# Patient Record
Sex: FEMALE | Race: ASIAN | NOT HISPANIC OR LATINO | URBAN - METROPOLITAN AREA
[De-identification: names, ages, dates, MRNs, and addresses within clinical notes are randomized per-mention and may not be internally consistent; named-entity substitution may affect disease eponyms.]

---

## 2021-12-31 ENCOUNTER — INPATIENT (INPATIENT)
Facility: HOSPITAL | Age: 66
LOS: 0 days | Discharge: HOME | DRG: 178 | End: 2022-01-01
Attending: STUDENT IN AN ORGANIZED HEALTH CARE EDUCATION/TRAINING PROGRAM | Admitting: STUDENT IN AN ORGANIZED HEALTH CARE EDUCATION/TRAINING PROGRAM
Payer: MEDICARE

## 2021-12-31 VITALS
TEMPERATURE: 99 F | SYSTOLIC BLOOD PRESSURE: 150 MMHG | RESPIRATION RATE: 18 BRPM | OXYGEN SATURATION: 99 % | HEIGHT: 61 IN | HEART RATE: 67 BPM | WEIGHT: 117.95 LBS | DIASTOLIC BLOOD PRESSURE: 84 MMHG

## 2021-12-31 DIAGNOSIS — R77.8 OTHER SPECIFIED ABNORMALITIES OF PLASMA PROTEINS: ICD-10-CM

## 2021-12-31 DIAGNOSIS — U07.1 COVID-19: ICD-10-CM

## 2021-12-31 DIAGNOSIS — E87.8 OTHER DISORDERS OF ELECTROLYTE AND FLUID BALANCE, NOT ELSEWHERE CLASSIFIED: ICD-10-CM

## 2021-12-31 DIAGNOSIS — I63.9 CEREBRAL INFARCTION, UNSPECIFIED: ICD-10-CM

## 2021-12-31 DIAGNOSIS — I21.4 NON-ST ELEVATION (NSTEMI) MYOCARDIAL INFARCTION: ICD-10-CM

## 2021-12-31 DIAGNOSIS — Z29.9 ENCOUNTER FOR PROPHYLACTIC MEASURES, UNSPECIFIED: ICD-10-CM

## 2021-12-31 DIAGNOSIS — I10 ESSENTIAL (PRIMARY) HYPERTENSION: ICD-10-CM

## 2021-12-31 DIAGNOSIS — R42 DIZZINESS AND GIDDINESS: ICD-10-CM

## 2021-12-31 LAB
ALBUMIN SERPL ELPH-MCNC: 3.9 G/DL — SIGNIFICANT CHANGE UP (ref 3.5–5)
ALP SERPL-CCNC: 65 U/L — SIGNIFICANT CHANGE UP (ref 40–120)
ALT FLD-CCNC: 27 U/L DA — SIGNIFICANT CHANGE UP (ref 10–60)
ANION GAP SERPL CALC-SCNC: 10 MMOL/L — SIGNIFICANT CHANGE UP (ref 5–17)
ANION GAP SERPL CALC-SCNC: 10 MMOL/L — SIGNIFICANT CHANGE UP (ref 5–17)
ANISOCYTOSIS BLD QL: SLIGHT — SIGNIFICANT CHANGE UP
APTT BLD: 46.9 SEC — HIGH (ref 27.5–35.5)
AST SERPL-CCNC: 24 U/L — SIGNIFICANT CHANGE UP (ref 10–40)
BASOPHILS # BLD AUTO: 0.01 K/UL — SIGNIFICANT CHANGE UP (ref 0–0.2)
BASOPHILS NFR BLD AUTO: 0.4 % — SIGNIFICANT CHANGE UP (ref 0–2)
BILIRUB SERPL-MCNC: 0.4 MG/DL — SIGNIFICANT CHANGE UP (ref 0.2–1.2)
BUN SERPL-MCNC: 11 MG/DL — SIGNIFICANT CHANGE UP (ref 7–18)
BUN SERPL-MCNC: 9 MG/DL — SIGNIFICANT CHANGE UP (ref 7–18)
CALCIUM SERPL-MCNC: 7.6 MG/DL — LOW (ref 8.4–10.5)
CALCIUM SERPL-MCNC: 8.6 MG/DL — SIGNIFICANT CHANGE UP (ref 8.4–10.5)
CHLORIDE SERPL-SCNC: 85 MMOL/L — LOW (ref 96–108)
CHLORIDE SERPL-SCNC: 93 MMOL/L — LOW (ref 96–108)
CO2 SERPL-SCNC: 25 MMOL/L — SIGNIFICANT CHANGE UP (ref 22–31)
CO2 SERPL-SCNC: 29 MMOL/L — SIGNIFICANT CHANGE UP (ref 22–31)
CREAT SERPL-MCNC: 0.74 MG/DL — SIGNIFICANT CHANGE UP (ref 0.5–1.3)
CREAT SERPL-MCNC: 0.84 MG/DL — SIGNIFICANT CHANGE UP (ref 0.5–1.3)
EOSINOPHIL # BLD AUTO: 0 K/UL — SIGNIFICANT CHANGE UP (ref 0–0.5)
EOSINOPHIL NFR BLD AUTO: 0 % — SIGNIFICANT CHANGE UP (ref 0–6)
GLUCOSE SERPL-MCNC: 109 MG/DL — HIGH (ref 70–99)
GLUCOSE SERPL-MCNC: 138 MG/DL — HIGH (ref 70–99)
HCT VFR BLD CALC: 37.8 % — SIGNIFICANT CHANGE UP (ref 34.5–45)
HGB BLD-MCNC: 12.2 G/DL — SIGNIFICANT CHANGE UP (ref 11.5–15.5)
IMM GRANULOCYTES NFR BLD AUTO: 0.4 % — SIGNIFICANT CHANGE UP (ref 0–1.5)
INR BLD: 1 RATIO — SIGNIFICANT CHANGE UP (ref 0.88–1.16)
LYMPHOCYTES # BLD AUTO: 0.9 K/UL — LOW (ref 1–3.3)
LYMPHOCYTES # BLD AUTO: 33 % — SIGNIFICANT CHANGE UP (ref 13–44)
MAGNESIUM SERPL-MCNC: 1.9 MG/DL — SIGNIFICANT CHANGE UP (ref 1.6–2.6)
MANUAL SMEAR VERIFICATION: SIGNIFICANT CHANGE UP
MCHC RBC-ENTMCNC: 21.2 PG — LOW (ref 27–34)
MCHC RBC-ENTMCNC: 32.3 GM/DL — SIGNIFICANT CHANGE UP (ref 32–36)
MCV RBC AUTO: 65.7 FL — LOW (ref 80–100)
MICROCYTES BLD QL: SLIGHT — SIGNIFICANT CHANGE UP
MONOCYTES # BLD AUTO: 0.39 K/UL — SIGNIFICANT CHANGE UP (ref 0–0.9)
MONOCYTES NFR BLD AUTO: 14.3 % — HIGH (ref 2–14)
NEUTROPHILS # BLD AUTO: 1.42 K/UL — LOW (ref 1.8–7.4)
NEUTROPHILS NFR BLD AUTO: 51.9 % — SIGNIFICANT CHANGE UP (ref 43–77)
NRBC # BLD: 0 /100 WBCS — SIGNIFICANT CHANGE UP (ref 0–0)
NT-PROBNP SERPL-SCNC: 237 PG/ML — HIGH (ref 0–125)
PLAT MORPH BLD: NORMAL — SIGNIFICANT CHANGE UP
PLATELET # BLD AUTO: 139 K/UL — LOW (ref 150–400)
POTASSIUM SERPL-MCNC: 3.2 MMOL/L — LOW (ref 3.5–5.3)
POTASSIUM SERPL-MCNC: 3.3 MMOL/L — LOW (ref 3.5–5.3)
POTASSIUM SERPL-SCNC: 3.2 MMOL/L — LOW (ref 3.5–5.3)
POTASSIUM SERPL-SCNC: 3.3 MMOL/L — LOW (ref 3.5–5.3)
PROT SERPL-MCNC: 8.4 G/DL — HIGH (ref 6–8.3)
PROTHROM AB SERPL-ACNC: 11.9 SEC — SIGNIFICANT CHANGE UP (ref 10.6–13.6)
RBC # BLD: 5.75 M/UL — HIGH (ref 3.8–5.2)
RBC # FLD: 13.7 % — SIGNIFICANT CHANGE UP (ref 10.3–14.5)
RBC BLD AUTO: ABNORMAL
SARS-COV-2 RNA SPEC QL NAA+PROBE: DETECTED
SODIUM SERPL-SCNC: 124 MMOL/L — LOW (ref 135–145)
SODIUM SERPL-SCNC: 128 MMOL/L — LOW (ref 135–145)
TROPONIN I, HIGH SENSITIVITY RESULT: 112.7 NG/L — HIGH
TROPONIN I, HIGH SENSITIVITY RESULT: 112.9 NG/L — HIGH
WBC # BLD: 2.73 K/UL — LOW (ref 3.8–10.5)
WBC # FLD AUTO: 2.73 K/UL — LOW (ref 3.8–10.5)

## 2021-12-31 PROCEDURE — 99285 EMERGENCY DEPT VISIT HI MDM: CPT

## 2021-12-31 PROCEDURE — 99223 1ST HOSP IP/OBS HIGH 75: CPT | Mod: GC

## 2021-12-31 PROCEDURE — 93010 ELECTROCARDIOGRAM REPORT: CPT

## 2021-12-31 PROCEDURE — 71045 X-RAY EXAM CHEST 1 VIEW: CPT | Mod: 26

## 2021-12-31 RX ORDER — NIFEDIPINE 30 MG
90 TABLET, EXTENDED RELEASE 24 HR ORAL DAILY
Refills: 0 | Status: DISCONTINUED | OUTPATIENT
Start: 2021-12-31 | End: 2022-01-01

## 2021-12-31 RX ORDER — ASPIRIN/CALCIUM CARB/MAGNESIUM 324 MG
324 TABLET ORAL ONCE
Refills: 0 | Status: COMPLETED | OUTPATIENT
Start: 2021-12-31 | End: 2021-12-31

## 2021-12-31 RX ORDER — POTASSIUM CHLORIDE 20 MEQ
10 PACKET (EA) ORAL
Refills: 0 | Status: COMPLETED | OUTPATIENT
Start: 2021-12-31 | End: 2021-12-31

## 2021-12-31 RX ORDER — CLOPIDOGREL BISULFATE 75 MG/1
300 TABLET, FILM COATED ORAL ONCE
Refills: 0 | Status: COMPLETED | OUTPATIENT
Start: 2021-12-31 | End: 2021-12-31

## 2021-12-31 RX ORDER — SODIUM CHLORIDE 9 MG/ML
1000 INJECTION INTRAMUSCULAR; INTRAVENOUS; SUBCUTANEOUS ONCE
Refills: 0 | Status: COMPLETED | OUTPATIENT
Start: 2021-12-31 | End: 2021-12-31

## 2021-12-31 RX ORDER — ATORVASTATIN CALCIUM 80 MG/1
20 TABLET, FILM COATED ORAL AT BEDTIME
Refills: 0 | Status: DISCONTINUED | OUTPATIENT
Start: 2021-12-31 | End: 2022-01-01

## 2021-12-31 RX ORDER — ENOXAPARIN SODIUM 100 MG/ML
40 INJECTION SUBCUTANEOUS DAILY
Refills: 0 | Status: DISCONTINUED | OUTPATIENT
Start: 2021-12-31 | End: 2022-01-01

## 2021-12-31 RX ORDER — MECLIZINE HCL 12.5 MG
25 TABLET ORAL THREE TIMES A DAY
Refills: 0 | Status: DISCONTINUED | OUTPATIENT
Start: 2021-12-31 | End: 2022-01-01

## 2021-12-31 RX ORDER — ENOXAPARIN SODIUM 100 MG/ML
50 INJECTION SUBCUTANEOUS ONCE
Refills: 0 | Status: COMPLETED | OUTPATIENT
Start: 2021-12-31 | End: 2021-12-31

## 2021-12-31 RX ORDER — HYDRALAZINE HCL 50 MG
50 TABLET ORAL EVERY 8 HOURS
Refills: 0 | Status: DISCONTINUED | OUTPATIENT
Start: 2021-12-31 | End: 2022-01-01

## 2021-12-31 RX ORDER — ASPIRIN/CALCIUM CARB/MAGNESIUM 324 MG
81 TABLET ORAL DAILY
Refills: 0 | Status: DISCONTINUED | OUTPATIENT
Start: 2021-12-31 | End: 2022-01-01

## 2021-12-31 RX ORDER — ONDANSETRON 8 MG/1
4 TABLET, FILM COATED ORAL ONCE
Refills: 0 | Status: COMPLETED | OUTPATIENT
Start: 2021-12-31 | End: 2021-12-31

## 2021-12-31 RX ORDER — SODIUM CHLORIDE 9 MG/ML
3 INJECTION INTRAMUSCULAR; INTRAVENOUS; SUBCUTANEOUS EVERY 8 HOURS
Refills: 0 | Status: DISCONTINUED | OUTPATIENT
Start: 2021-12-31 | End: 2022-01-01

## 2021-12-31 RX ORDER — SODIUM CHLORIDE 9 MG/ML
1000 INJECTION INTRAMUSCULAR; INTRAVENOUS; SUBCUTANEOUS
Refills: 0 | Status: DISCONTINUED | OUTPATIENT
Start: 2021-12-31 | End: 2022-01-01

## 2021-12-31 RX ADMIN — Medication 100 MILLIEQUIVALENT(S): at 06:11

## 2021-12-31 RX ADMIN — SODIUM CHLORIDE 1000 MILLILITER(S): 9 INJECTION INTRAMUSCULAR; INTRAVENOUS; SUBCUTANEOUS at 04:56

## 2021-12-31 RX ADMIN — SODIUM CHLORIDE 3 MILLILITER(S): 9 INJECTION INTRAMUSCULAR; INTRAVENOUS; SUBCUTANEOUS at 21:11

## 2021-12-31 RX ADMIN — Medication 25 MILLIGRAM(S): at 13:46

## 2021-12-31 RX ADMIN — SODIUM CHLORIDE 80 MILLILITER(S): 9 INJECTION INTRAMUSCULAR; INTRAVENOUS; SUBCUTANEOUS at 12:23

## 2021-12-31 RX ADMIN — Medication 10 MILLIEQUIVALENT(S): at 07:11

## 2021-12-31 RX ADMIN — SODIUM CHLORIDE 3 MILLILITER(S): 9 INJECTION INTRAMUSCULAR; INTRAVENOUS; SUBCUTANEOUS at 05:39

## 2021-12-31 RX ADMIN — Medication 10 MILLIEQUIVALENT(S): at 08:36

## 2021-12-31 RX ADMIN — ATORVASTATIN CALCIUM 20 MILLIGRAM(S): 80 TABLET, FILM COATED ORAL at 23:29

## 2021-12-31 RX ADMIN — ONDANSETRON 4 MILLIGRAM(S): 8 TABLET, FILM COATED ORAL at 04:56

## 2021-12-31 RX ADMIN — Medication 100 MILLIEQUIVALENT(S): at 20:15

## 2021-12-31 RX ADMIN — CLOPIDOGREL BISULFATE 300 MILLIGRAM(S): 75 TABLET, FILM COATED ORAL at 06:11

## 2021-12-31 RX ADMIN — Medication 50 MILLIGRAM(S): at 23:27

## 2021-12-31 RX ADMIN — Medication 100 MILLIEQUIVALENT(S): at 09:02

## 2021-12-31 RX ADMIN — Medication 100 MILLIEQUIVALENT(S): at 23:27

## 2021-12-31 RX ADMIN — Medication 100 MILLIEQUIVALENT(S): at 07:36

## 2021-12-31 RX ADMIN — SODIUM CHLORIDE 80 MILLILITER(S): 9 INJECTION INTRAMUSCULAR; INTRAVENOUS; SUBCUTANEOUS at 23:28

## 2021-12-31 RX ADMIN — SODIUM CHLORIDE 3 MILLILITER(S): 9 INJECTION INTRAMUSCULAR; INTRAVENOUS; SUBCUTANEOUS at 13:02

## 2021-12-31 RX ADMIN — ENOXAPARIN SODIUM 50 MILLIGRAM(S): 100 INJECTION SUBCUTANEOUS at 06:56

## 2021-12-31 RX ADMIN — Medication 324 MILLIGRAM(S): at 06:11

## 2021-12-31 RX ADMIN — Medication 25 MILLIGRAM(S): at 23:26

## 2021-12-31 NOTE — ED PROVIDER NOTE - PHYSICAL EXAMINATION
Vital Signs Reviewed  GEN: Comfortable, NAD  HEENT: NCAT, MMM, Neck Supple  RESP: CTAB, No rales/rhonchi/wheezing  CV: RRR, S1S2, No murmurs  ABD: No TTP, ND, No masses  Extrem/Skin: Equal pulses bilat, No cyanosis/edema/rashes  Neuro: AAOx3, CNs Grossly Intact, Equal strength/sensation in all extremities bilat, No Pronator Drift, Vital Signs Reviewed  GEN: Comfortable, NAD  HEENT: NCAT, MMM, Neck Supple  RESP: CTAB, No rales/rhonchi/wheezing  CV: RRR, S1S2, No murmurs  ABD: No TTP, ND, No masses  Extrem/Skin: Equal pulses bilat, No cyanosis/edema/rashes  Neuro: AAOx3, CNs Grossly Intact, Equal strength/sensation in all extremities bilat, No Pronator Drift

## 2021-12-31 NOTE — H&P ADULT - PROBLEM SELECTOR PLAN 5
Continue aspirin and atorvastatin Takes nifedipine and hydralazine at home  Continue home medications with parameters

## 2021-12-31 NOTE — ED PROVIDER NOTE - CARE PLAN
Principal Discharge DX:	NSTEMI (non-ST elevation myocardial infarction)  Secondary Diagnosis:	Hyponatremia   1

## 2021-12-31 NOTE — H&P ADULT - NSHPPHYSICALEXAM_GEN_ALL_CORE
LOS:     VITALS:   T(C): 37 (12-31-21 @ 11:35), Max: 37.1 (12-31-21 @ 03:40)  HR: 60 (12-31-21 @ 08:00) (60 - 67)  BP: 149/68 (12-31-21 @ 11:35) (149/68 - 152/73)  RR: 17 (12-31-21 @ 11:35) (17 - 18)  SpO2: 97% (12-31-21 @ 11:35) (96% - 99%)    GENERAL: NAD, lying in bed comfortably  HEAD:  Atraumatic, Normocephalic  EYES: EOMI, PERRLA, conjunctiva and sclera clear  ENT: Moist mucous membranes  NECK: Supple, No JVD  CHEST/LUNG: Clear to auscultation bilaterally; No rales, rhonchi, wheezing, or rubs. Unlabored respirations  HEART: Regular rate and rhythm; No murmurs, rubs, or gallops  ABDOMEN: BSx4; Soft, nontender, nondistended  EXTREMITIES:  2+ Peripheral Pulses, brisk capillary refill. No clubbing, cyanosis, or edema  NERVOUS SYSTEM:  A&Ox3, no focal deficits   SKIN: No rashes or lesions

## 2021-12-31 NOTE — H&P ADULT - PROBLEM SELECTOR PLAN 3
Na 124 and K 3.3 on admission  Likely 2/2 vomiting and poor oral intake   Improving with IV fluids   Getting 3 riders of K  Continue NS at 80ml/hr T1 112, T2 112  EKG: no st changes  No need for further cardiac workup at the moment   Continue aspirin and atorvastatin   Dr. Mendiola consulted

## 2021-12-31 NOTE — H&P ADULT - HISTORY OF PRESENT ILLNESS
67y/o F with PMH of HTN , CVA presents to the ED for dizziness and vomiting for 2 days. Patient states she is very dizzy when she sits or stands and is worse with movement. She has NBNB vomiting 3-4 times yesterday. She states she cant even open her eyes because she gets very dizzy. Denies tinnitus, headache, LOC, vision or hearing changes, abdominal pain, diarrhea, constipation or any other complaints. She is vaccinated x2 with pfizer  67y/o F with PMH of HTN , CVA presents to the ED for dizziness and vomiting for 2 days. Patient states she is very dizzy when she sits or stands and is worse with movement. She has NBNB vomiting 3-4 times yesterday. She states she cant even open her eyes because she gets very dizzy. Denies tinnitus, headache, LOC, vision or hearing changes, abdominal pain, diarrhea, constipation or any other complaints. She is vaccinated x2 with pfizer. She went to her PCP yesterday who prescribed meclizine and zofran.

## 2021-12-31 NOTE — ED ADULT NURSE NOTE - OBJECTIVE STATEMENT
Patient presents to ED for complaint of dizziness. As per note, daughter stated that patient was nauseous, vomiting 2 days ago and reported high BP. Patient is lying on stretcher, in no acute distress. She is AAOx1 to person. No respiratory distress noted.

## 2021-12-31 NOTE — PATIENT PROFILE ADULT - FALL HARM RISK - HARM RISK INTERVENTIONS
Assistance with ambulation/Assistance OOB with selected safe patient handling equipment/Communicate Risk of Fall with Harm to all staff/Monitor gait and stability/Reinforce activity limits and safety measures with patient and family/Sit up slowly, dangle for a short time, stand at bedside before walking/Tailored Fall Risk Interventions/Visual Cue: Yellow wristband and red socks/Bed in lowest position, wheels locked, appropriate side rails in place/Call bell, personal items and telephone in reach/Instruct patient to call for assistance before getting out of bed or chair/Non-slip footwear when patient is out of bed/[place] to call system/Physically safe environment - no spills, clutter or unnecessary equipment/Purposeful Proactive Rounding/Room/bathroom lighting operational, light cord in reach

## 2021-12-31 NOTE — H&P ADULT - ATTENDING COMMENTS
a/p# COVID-19 # Dizziness- likely BPPV # Hyponatremia # Hypokalemia # Thrombocytopenia    -vaccinated, saturating well. will hold off on Remdesivir    -dizziness improved, non focal -will start Meclizine standing, if no improvement in next 48 hours than consider MRI [place] to r/o cerebellar stroke    -no more vomiting wants to eat- start diet    -likely hpovolumeic hyponatremia due to poor po intake and vomiting- ivf, rpt bmp in evening    -replete lytes    -out patient work up for thrombocytopenia a/p# COVID-19 # Dizziness- likely BPPV # Hyponatremia # Hypokalemia # Thrombocytopenia #Elevated troponins    -No cp/ sob or cardiac s/s- cardiology consult - Dr. Mendiola, trend trop    -vaccinated, saturating well. will hold off on Remdesivir    -dizziness improved, non focal -will start Meclizine standing, if no improvement in next 48 hours than consider MRI [facility] to r/o cerebellar stroke    -no more vomiting wants to eat- start diet    -likely hpovolumeic hyponatremia due to poor po intake and vomiting- ivf, rpt bmp in evening    -replete lytes    -out patient work up for thrombocytopenia

## 2021-12-31 NOTE — H&P ADULT - PROBLEM SELECTOR PLAN 7
IMPROVE VTE Individual Risk Assessment  RISK                                                                Points  [  ] Previous VTE                                                  3  [  ] Thrombophilia                                               2  [  ] Lower limb paralysis                                      2        (unable to hold up >15 seconds)    [  ] Current Cancer                                              2         (within 6 months)  [x  ] Immobilization > 24 hrs                                1  [  ] ICU/CCU stay > 24 hours                              1  [x  ] Age > 60                                                      1  IMPROVE VTE Score _________2, -- for DVT proph  lovenox

## 2021-12-31 NOTE — H&P ADULT - PROBLEM SELECTOR PLAN 4
Takes nifedipine and hydralazine at home  Continue home medications with parameters Na 124 and K 3.3 on admission  Likely 2/2 vomiting and poor oral intake   Improving with IV fluids   Getting 3 riders of K  Continue NS at 80ml/hr

## 2021-12-31 NOTE — ED PROVIDER NOTE - CLINICAL SUMMARY MEDICAL DECISION MAKING FREE TEXT BOX
Patient p/w dizziness and nausea. Will get labs, and CT head. Patient stable and will reassess. Patient p/w dizziness and nausea. Will get labs, and CT head. Patient stable and will reassess.    Labs showing Na 124, Trop 112 with nml Cr. COVID pos though pt denies any resp symptoms. Tx NSTEMI with lovenox, pt given fluids for hypoNa and made NPO. Pt stable and endorsed to inpatient team.

## 2021-12-31 NOTE — H&P ADULT - PROBLEM SELECTOR PLAN 2
T1 112, T2 112  EKG: no st changes  No need for further cardiac workup at the moment   Continue aspirin and atorvastatin   Dr. Mendiola consulted Covid +  On room air   Not in respiratory distress.   Will hold remdesivir and decadron  Monitor for Hemodynamic instability

## 2021-12-31 NOTE — PATIENT PROFILE ADULT - FALL HARM RISK - FACTORS
I have discussed the discharge plan with the patient. The patient agrees with the plan, as discussed.  The patient understands Emergency Department diagnosis is a preliminary diagnosis often based on limited information and that the patient must adhere to the follow-up plan as discussed.  The patient understands that if the symptoms worsen or if prescribed medications do not have the desired/planned effect that the patient may return to the Emergency Department at any time for further evaluation and treatment.      Hand Contusion  A hand contusion is a deep bruise to the hand. Contusions are the result of a blunt injury to tissues and muscle fibers under the skin. The injury causes bleeding under the skin. The skin overlying the contusion may turn blue, purple, or yellow. Minor injuries will give you a painless contusion, but more severe contusions may stay painful and swollen for a few weeks.  What are the causes?  A contusion is usually caused by a hard hit, trauma, or direct force to your hand, such as having a heavy object fall on your hand.  What are the signs or symptoms?  Symptoms of this condition include:  Swelling of the hand.Pain and tenderness of the hand.Discoloration of the hand. The area may have redness and then turn blue, purple, or yellow.How is this diagnosed?  This condition is diagnosed from a physical exam and your medical history. An X-ray may be needed to see if there are any other injuries, such as broken bones (fractures). Sometimes, a CT scan or MRI may be needed if your health care provider is concerned that you may have torn or injured ligaments.  How is this treated?  An elastic wrap may be recommended to support your hand. In general, the best treatment for a hand contusion is rest, ice, pressure (compression), and elevation of the injured area. This is often called RICE therapy. Over-the-counter medicines may also be recommended for pain control.  Follow these instructions at home:  RICE Therapy     Rest the injured area.If directed, apply ice to the injured area:  Put ice in a plastic bag.Place a towel between your skin and the bag.Leave the ice on for 20 minutes, 2–3 times a day.If directed, apply light compression to the injured area using an elastic wrap. Make sure the wrap is not too tight. Remove and reapply the wrap as told by your health care provider. If your fingers become numb, cold, or blue, take the wrap off and reapply it more loosely.Raise (elevate) the injured area above the level of your heart while you are sitting or lying down.General instructions     Image   Take over-the-counter and prescription medicines only as told by your health care provider.Protect your hand from getting injured further.Keep all follow-up visits as told by your health care provider. This is important.Contact a health care provider if:  Your symptoms do not improve after several days of treatment.You have increased redness, swelling, or pain in your hand or fingers.You have difficulty moving the injured area.Your swelling or pain is not relieved with medicines.Get help right away if:  You have severe pain.Your hand or fingers become numb.Your hand or fingers turn pale, blue, or cold.You cannot move your hand or wrist.Your hand is warm to the touch.This information is not intended to replace advice given to you by your health care provider. Make sure you discuss any questions you have with your health care provider. Dizziness

## 2021-12-31 NOTE — ED ADULT TRIAGE NOTE - CHIEF COMPLAINT QUOTE
As per daughter pt compliant of dizziness that started 2 days ago, vomiting started yesterday and high blood pressure. Pt has history of stroke and has left side weakness.

## 2021-12-31 NOTE — H&P ADULT - ASSESSMENT
67y/o F with PMH of HTN , CVA presents to the ED for dizziness and vomiting for 2 days. Admitted for dizziness

## 2021-12-31 NOTE — H&P ADULT - PROBLEM SELECTOR PLAN 6
IMPROVE VTE Individual Risk Assessment  RISK                                                                Points  [  ] Previous VTE                                                  3  [  ] Thrombophilia                                               2  [  ] Lower limb paralysis                                      2        (unable to hold up >15 seconds)    [  ] Current Cancer                                              2         (within 6 months)  [x  ] Immobilization > 24 hrs                                1  [  ] ICU/CCU stay > 24 hours                              1  [x  ] Age > 60                                                      1  IMPROVE VTE Score _________2, -- for DVT proph  lovenox Continue aspirin and atorvastatin

## 2021-12-31 NOTE — H&P ADULT - PROBLEM SELECTOR PLAN 1
Presented with dizziness   BPPV Presented with dizziness   BPPV vs orthostatic hypotension   Follow orthostatics   Admit to tele   Started on meclizine and Zofran  Continue to monitor, if does not improve in 24 hours will need Neuro consult for MRI

## 2021-12-31 NOTE — ED PROVIDER NOTE - OBJECTIVE STATEMENT
[place]  used, #[id number]  65 y/o female with hx of HTN, stroke, p/w 2 days of dizziness and nausea. Pt also endorsing some photophobia. Pt describes dizziness as "I just feel discomfort" and denies any room-spinning. Pt states she has been too dizzy to walk. Pt denies any recent surgeries. Pt denies any fever, chest pain, shortness of breath, belly pain, headache, focal numbness/weakness, vomiting, diarrhea, or any other recent illnesses and hospitalizations. Cantonese  used, #[id number]  67 y/o female with hx of HTN, CVA (denies any deficits) p/w 2 days of dizziness and nausea. Pt also endorsing some photophobia. Pt describes dizziness as "I just feel discomfort" and denies any room-spinning. Pt states she has been too dizzy to walk. Pt denies any recent surgeries. Pt denies any fever, chest pain, shortness of breath, belly pain, headache, focal numbness/weakness, vomiting, diarrhea, or any other recent illnesses and hospitalizations.

## 2022-01-01 VITALS
RESPIRATION RATE: 18 BRPM | OXYGEN SATURATION: 98 % | SYSTOLIC BLOOD PRESSURE: 145 MMHG | TEMPERATURE: 98 F | HEART RATE: 72 BPM | DIASTOLIC BLOOD PRESSURE: 75 MMHG

## 2022-01-01 LAB
ALBUMIN SERPL ELPH-MCNC: 2.8 G/DL — LOW (ref 3.5–5)
ALBUMIN SERPL ELPH-MCNC: 3.3 G/DL — LOW (ref 3.5–5)
ALP SERPL-CCNC: 47 U/L — SIGNIFICANT CHANGE UP (ref 40–120)
ALP SERPL-CCNC: 56 U/L — SIGNIFICANT CHANGE UP (ref 40–120)
ALT FLD-CCNC: 23 U/L DA — SIGNIFICANT CHANGE UP (ref 10–60)
ALT FLD-CCNC: 25 U/L DA — SIGNIFICANT CHANGE UP (ref 10–60)
ANION GAP SERPL CALC-SCNC: 5 MMOL/L — SIGNIFICANT CHANGE UP (ref 5–17)
ANION GAP SERPL CALC-SCNC: 6 MMOL/L — SIGNIFICANT CHANGE UP (ref 5–17)
AST SERPL-CCNC: 22 U/L — SIGNIFICANT CHANGE UP (ref 10–40)
AST SERPL-CCNC: 22 U/L — SIGNIFICANT CHANGE UP (ref 10–40)
BILIRUB SERPL-MCNC: 0.3 MG/DL — SIGNIFICANT CHANGE UP (ref 0.2–1.2)
BILIRUB SERPL-MCNC: 0.3 MG/DL — SIGNIFICANT CHANGE UP (ref 0.2–1.2)
BUN SERPL-MCNC: 13 MG/DL — SIGNIFICANT CHANGE UP (ref 7–18)
BUN SERPL-MCNC: 15 MG/DL — SIGNIFICANT CHANGE UP (ref 7–18)
CALCIUM SERPL-MCNC: 8 MG/DL — LOW (ref 8.4–10.5)
CALCIUM SERPL-MCNC: 8.1 MG/DL — LOW (ref 8.4–10.5)
CHLORIDE SERPL-SCNC: 106 MMOL/L — SIGNIFICANT CHANGE UP (ref 96–108)
CHLORIDE SERPL-SCNC: 109 MMOL/L — HIGH (ref 96–108)
CO2 SERPL-SCNC: 25 MMOL/L — SIGNIFICANT CHANGE UP (ref 22–31)
CO2 SERPL-SCNC: 26 MMOL/L — SIGNIFICANT CHANGE UP (ref 22–31)
CREAT SERPL-MCNC: 0.88 MG/DL — SIGNIFICANT CHANGE UP (ref 0.5–1.3)
CREAT SERPL-MCNC: 0.89 MG/DL — SIGNIFICANT CHANGE UP (ref 0.5–1.3)
GLUCOSE SERPL-MCNC: 83 MG/DL — SIGNIFICANT CHANGE UP (ref 70–99)
GLUCOSE SERPL-MCNC: 95 MG/DL — SIGNIFICANT CHANGE UP (ref 70–99)
HCT VFR BLD CALC: 36.1 % — SIGNIFICANT CHANGE UP (ref 34.5–45)
HCV AB S/CO SERPL IA: 0.18 S/CO — SIGNIFICANT CHANGE UP (ref 0–0.99)
HCV AB SERPL-IMP: SIGNIFICANT CHANGE UP
HGB BLD-MCNC: 11.6 G/DL — SIGNIFICANT CHANGE UP (ref 11.5–15.5)
MAGNESIUM SERPL-MCNC: 2.5 MG/DL — SIGNIFICANT CHANGE UP (ref 1.6–2.6)
MCHC RBC-ENTMCNC: 21.6 PG — LOW (ref 27–34)
MCHC RBC-ENTMCNC: 32.1 GM/DL — SIGNIFICANT CHANGE UP (ref 32–36)
MCV RBC AUTO: 67.2 FL — LOW (ref 80–100)
NRBC # BLD: 0 /100 WBCS — SIGNIFICANT CHANGE UP (ref 0–0)
PHOSPHATE SERPL-MCNC: 2.5 MG/DL — SIGNIFICANT CHANGE UP (ref 2.5–4.5)
PLATELET # BLD AUTO: 139 K/UL — LOW (ref 150–400)
POTASSIUM SERPL-MCNC: 3.3 MMOL/L — LOW (ref 3.5–5.3)
POTASSIUM SERPL-MCNC: 3.3 MMOL/L — LOW (ref 3.5–5.3)
POTASSIUM SERPL-SCNC: 3.3 MMOL/L — LOW (ref 3.5–5.3)
POTASSIUM SERPL-SCNC: 3.3 MMOL/L — LOW (ref 3.5–5.3)
PROT SERPL-MCNC: 6.5 G/DL — SIGNIFICANT CHANGE UP (ref 6–8.3)
PROT SERPL-MCNC: 7.4 G/DL — SIGNIFICANT CHANGE UP (ref 6–8.3)
RBC # BLD: 5.37 M/UL — HIGH (ref 3.8–5.2)
RBC # FLD: 14.1 % — SIGNIFICANT CHANGE UP (ref 10.3–14.5)
SODIUM SERPL-SCNC: 138 MMOL/L — SIGNIFICANT CHANGE UP (ref 135–145)
SODIUM SERPL-SCNC: 139 MMOL/L — SIGNIFICANT CHANGE UP (ref 135–145)
WBC # BLD: 4.4 K/UL — SIGNIFICANT CHANGE UP (ref 3.8–10.5)
WBC # FLD AUTO: 4.4 K/UL — SIGNIFICANT CHANGE UP (ref 3.8–10.5)

## 2022-01-01 PROCEDURE — 86803 HEPATITIS C AB TEST: CPT

## 2022-01-01 PROCEDURE — 93005 ELECTROCARDIOGRAM TRACING: CPT

## 2022-01-01 PROCEDURE — 80048 BASIC METABOLIC PNL TOTAL CA: CPT

## 2022-01-01 PROCEDURE — 83880 ASSAY OF NATRIURETIC PEPTIDE: CPT

## 2022-01-01 PROCEDURE — 99239 HOSP IP/OBS DSCHRG MGMT >30: CPT

## 2022-01-01 PROCEDURE — 85025 COMPLETE CBC W/AUTO DIFF WBC: CPT

## 2022-01-01 PROCEDURE — 99285 EMERGENCY DEPT VISIT HI MDM: CPT | Mod: 25

## 2022-01-01 PROCEDURE — 83735 ASSAY OF MAGNESIUM: CPT

## 2022-01-01 PROCEDURE — 84100 ASSAY OF PHOSPHORUS: CPT

## 2022-01-01 PROCEDURE — 96374 THER/PROPH/DIAG INJ IV PUSH: CPT

## 2022-01-01 PROCEDURE — 80053 COMPREHEN METABOLIC PANEL: CPT

## 2022-01-01 PROCEDURE — 85730 THROMBOPLASTIN TIME PARTIAL: CPT

## 2022-01-01 PROCEDURE — 71045 X-RAY EXAM CHEST 1 VIEW: CPT

## 2022-01-01 PROCEDURE — 36415 COLL VENOUS BLD VENIPUNCTURE: CPT

## 2022-01-01 PROCEDURE — 87635 SARS-COV-2 COVID-19 AMP PRB: CPT

## 2022-01-01 PROCEDURE — 85610 PROTHROMBIN TIME: CPT

## 2022-01-01 PROCEDURE — 85027 COMPLETE CBC AUTOMATED: CPT

## 2022-01-01 PROCEDURE — 84484 ASSAY OF TROPONIN QUANT: CPT

## 2022-01-01 PROCEDURE — 99053 MED SERV 10PM-8AM 24 HR FAC: CPT

## 2022-01-01 PROCEDURE — 96372 THER/PROPH/DIAG INJ SC/IM: CPT

## 2022-01-01 RX ORDER — ONDANSETRON 8 MG/1
1 TABLET, FILM COATED ORAL
Qty: 0 | Refills: 0 | DISCHARGE

## 2022-01-01 RX ORDER — MECLIZINE HCL 12.5 MG
1 TABLET ORAL
Qty: 0 | Refills: 0 | DISCHARGE

## 2022-01-01 RX ORDER — POTASSIUM CHLORIDE 20 MEQ
40 PACKET (EA) ORAL ONCE
Refills: 0 | Status: COMPLETED | OUTPATIENT
Start: 2022-01-01 | End: 2022-01-01

## 2022-01-01 RX ADMIN — Medication 100 MILLIEQUIVALENT(S): at 00:16

## 2022-01-01 RX ADMIN — Medication 81 MILLIGRAM(S): at 12:07

## 2022-01-01 RX ADMIN — SODIUM CHLORIDE 3 MILLILITER(S): 9 INJECTION INTRAMUSCULAR; INTRAVENOUS; SUBCUTANEOUS at 14:40

## 2022-01-01 RX ADMIN — SODIUM CHLORIDE 3 MILLILITER(S): 9 INJECTION INTRAMUSCULAR; INTRAVENOUS; SUBCUTANEOUS at 06:23

## 2022-01-01 RX ADMIN — ENOXAPARIN SODIUM 40 MILLIGRAM(S): 100 INJECTION SUBCUTANEOUS at 12:20

## 2022-01-01 RX ADMIN — Medication 40 MILLIEQUIVALENT(S): at 12:19

## 2022-01-01 RX ADMIN — Medication 25 MILLIGRAM(S): at 15:02

## 2022-01-01 RX ADMIN — Medication 25 MILLIGRAM(S): at 06:30

## 2022-01-01 RX ADMIN — Medication 50 MILLIGRAM(S): at 15:02

## 2022-01-01 NOTE — DISCHARGE NOTE NURSING/CASE MANAGEMENT/SOCIAL WORK - NSDCPEFALRISK_GEN_ALL_CORE
For information on Fall & Injury Prevention, visit: [email or web address] OR  [email or web address] OR  https://www.cdc.gov/steadi/patient.html

## 2022-01-01 NOTE — DISCHARGE NOTE PROVIDER - NSDCMRMEDTOKEN_GEN_ALL_CORE_FT
aspirin 81 mg oral tablet: 1 tab(s) orally once a day  hydrALAZINE 50 mg oral tablet: 1 tab(s) orally every 8 hours  Lipitor 20 mg oral tablet: 1 tab(s) orally once a day  meclizine 25 mg oral tablet: 1 tab(s) orally 2 times a day, As Needed for dizziness  NIFEdipine 90 mg oral tablet, extended release: 1 tab(s) orally once a day  Zofran 4 mg oral tablet: 1 tab(s) orally every 6 hours, As Needed for nausea

## 2022-01-01 NOTE — DISCHARGE NOTE NURSING/CASE MANAGEMENT/SOCIAL WORK - PATIENT PORTAL LINK FT
You can access the FollowMyHealth Patient Portal offered by [facility] by registering at the following website: [email or web address]. By joining [facility]’s FollowMyHealth portal, you will also be able to view your health information using other applications (apps) compatible with our system.

## 2022-01-01 NOTE — DISCHARGE NOTE PROVIDER - HOSPITAL COURSE
65y/o F with PMH of HTN , CVA presents to the ED for dizziness and vomiting for 2 days. Patient states she is very dizzy when she sits or stands and is worse with movement. She has NBNB vomiting 3-4 times yesterday. She states she cant even open her eyes because she gets very dizzy. Denies tinnitus, headache, LOC, vision or hearing changes, abdominal pain, diarrhea, constipation or any other complaints. She is vaccinated x2 with pfizer. She went to her PCP yesterday who prescribed meclizine and zofran. In the ED, patient Na was 124, K 3.3 likely due to nausea, vomiting and poor oral intake. Patient was hydrated with IVF and sodium improved to normal amount. Patient reports her nausea and dizziness improved and she requested to go home. Patient tolerated a regular diet and able to ambulate independently. Patient had midlly elevated troponin, likely related to COVID infection. Denies chest pain or shortness of breath. Patient to go home to follow-up with PCP in 1 week.

## 2022-01-01 NOTE — DISCHARGE NOTE PROVIDER - CARE PROVIDER_API CALL
Ana Gill  INTERNAL MEDICINE  [address] [place] [address]  Phone: [phone]  Fax: [phone]  Follow Up Time: 1 week

## 2022-01-01 NOTE — DISCHARGE NOTE PROVIDER - NSDCCPCAREPLAN_GEN_ALL_CORE_FT
PRINCIPAL DISCHARGE DIAGNOSIS  Diagnosis: Hyponatremia  Assessment and Plan of Treatment: You presented to the hospital with a low sodium. This is most likely due to your nausea and vomiting and poor oral intake. You were given IV fluids and your sodium improved. Your nausea also improved. You should continue to drink extra fluid and eat as tolerated. Return to the hospital if you are unable to keep food down.      SECONDARY DISCHARGE DIAGNOSES  Diagnosis: Demand ischemia of myocardium  Assessment and Plan of Treatment: Your troponin was elevated when you presented to the hospital. This was most likely due to your COVID infection. You did not have chest pain or any changes on your EKG. Please follow-up with your Primary Care Provider.    Diagnosis: 2019 novel coronavirus disease (COVID-19)  Assessment and Plan of Treatment: You were diagnosed with COVID-19. This is most likely the cause of your symptoms. You did not have any respiratory distress and your oxygen levels are normal. You should continue taking aspirin. You should return home and continue to quarentine until you do not have fevers for 3 days and your symptoms improve. You should wash your hands frequently and clean common spaces periodically. You should wear a mask if you leave your room. Please return to the emergency room if you have difficulty breathing or cannot finish a sentence without needing to take a breath.    Diagnosis: Dizziness  Assessment and Plan of Treatment: You presented to the hospital with dizziness. This was most likely due to your low sodium and your COVID infection. Your symptoms are improving after we gave you fluids and you tolerated eating regular food. Continue to drink plenty of fluids. You can take meclizine 25mg every 12 hours as needed for dizziness. Please follow-up with your primary care provider.    Diagnosis: Hypertension  Assessment and Plan of Treatment: Continue taking your home medications.

## 2022-07-16 ENCOUNTER — INPATIENT (INPATIENT)
Facility: HOSPITAL | Age: 67
LOS: 1 days | Discharge: LEFT BEFORE DISCHARGE | DRG: 149 | End: 2022-07-18
Attending: INTERNAL MEDICINE | Admitting: INTERNAL MEDICINE
Payer: COMMERCIAL

## 2022-07-16 VITALS
TEMPERATURE: 98 F | HEIGHT: 61 IN | WEIGHT: 128.09 LBS | HEART RATE: 68 BPM | DIASTOLIC BLOOD PRESSURE: 81 MMHG | OXYGEN SATURATION: 100 % | SYSTOLIC BLOOD PRESSURE: 181 MMHG | RESPIRATION RATE: 17 BRPM

## 2022-07-16 DIAGNOSIS — I10 ESSENTIAL (PRIMARY) HYPERTENSION: ICD-10-CM

## 2022-07-16 DIAGNOSIS — E87.6 HYPOKALEMIA: ICD-10-CM

## 2022-07-16 DIAGNOSIS — Z29.9 ENCOUNTER FOR PROPHYLACTIC MEASURES, UNSPECIFIED: ICD-10-CM

## 2022-07-16 DIAGNOSIS — R42 DIZZINESS AND GIDDINESS: ICD-10-CM

## 2022-07-16 DIAGNOSIS — E87.1 HYPO-OSMOLALITY AND HYPONATREMIA: ICD-10-CM

## 2022-07-16 DIAGNOSIS — R11.2 NAUSEA WITH VOMITING, UNSPECIFIED: ICD-10-CM

## 2022-07-16 DIAGNOSIS — R77.8 OTHER SPECIFIED ABNORMALITIES OF PLASMA PROTEINS: ICD-10-CM

## 2022-07-16 PROBLEM — I63.9 CEREBRAL INFARCTION, UNSPECIFIED: Chronic | Status: ACTIVE | Noted: 2021-12-31

## 2022-07-16 LAB
ALBUMIN SERPL ELPH-MCNC: 3.8 G/DL — SIGNIFICANT CHANGE UP (ref 3.5–5)
ALP SERPL-CCNC: 73 U/L — SIGNIFICANT CHANGE UP (ref 40–120)
ALT FLD-CCNC: 32 U/L DA — SIGNIFICANT CHANGE UP (ref 10–60)
ANION GAP SERPL CALC-SCNC: 6 MMOL/L — SIGNIFICANT CHANGE UP (ref 5–17)
ANION GAP SERPL CALC-SCNC: 9 MMOL/L — SIGNIFICANT CHANGE UP (ref 5–17)
AST SERPL-CCNC: 27 U/L — SIGNIFICANT CHANGE UP (ref 10–40)
BASOPHILS # BLD AUTO: 0.02 K/UL — SIGNIFICANT CHANGE UP (ref 0–0.2)
BASOPHILS NFR BLD AUTO: 0.3 % — SIGNIFICANT CHANGE UP (ref 0–2)
BILIRUB SERPL-MCNC: 0.4 MG/DL — SIGNIFICANT CHANGE UP (ref 0.2–1.2)
BUN SERPL-MCNC: 10 MG/DL — SIGNIFICANT CHANGE UP (ref 7–18)
BUN SERPL-MCNC: 12 MG/DL — SIGNIFICANT CHANGE UP (ref 7–18)
CALCIUM SERPL-MCNC: 9 MG/DL — SIGNIFICANT CHANGE UP (ref 8.4–10.5)
CALCIUM SERPL-MCNC: 9.2 MG/DL — SIGNIFICANT CHANGE UP (ref 8.4–10.5)
CHLORIDE SERPL-SCNC: 93 MMOL/L — LOW (ref 96–108)
CHLORIDE SERPL-SCNC: 98 MMOL/L — SIGNIFICANT CHANGE UP (ref 96–108)
CO2 SERPL-SCNC: 26 MMOL/L — SIGNIFICANT CHANGE UP (ref 22–31)
CO2 SERPL-SCNC: 26 MMOL/L — SIGNIFICANT CHANGE UP (ref 22–31)
CREAT SERPL-MCNC: 0.79 MG/DL — SIGNIFICANT CHANGE UP (ref 0.5–1.3)
CREAT SERPL-MCNC: 0.82 MG/DL — SIGNIFICANT CHANGE UP (ref 0.5–1.3)
EGFR: 78 ML/MIN/1.73M2 — SIGNIFICANT CHANGE UP
EGFR: 81 ML/MIN/1.73M2 — SIGNIFICANT CHANGE UP
EOSINOPHIL # BLD AUTO: 0 K/UL — SIGNIFICANT CHANGE UP (ref 0–0.5)
EOSINOPHIL NFR BLD AUTO: 0 % — SIGNIFICANT CHANGE UP (ref 0–6)
GLUCOSE SERPL-MCNC: 138 MG/DL — HIGH (ref 70–99)
GLUCOSE SERPL-MCNC: 157 MG/DL — HIGH (ref 70–99)
HCT VFR BLD CALC: 38.5 % — SIGNIFICANT CHANGE UP (ref 34.5–45)
HGB BLD-MCNC: 12.5 G/DL — SIGNIFICANT CHANGE UP (ref 11.5–15.5)
IMM GRANULOCYTES NFR BLD AUTO: 0.3 % — SIGNIFICANT CHANGE UP (ref 0–1.5)
LYMPHOCYTES # BLD AUTO: 0.88 K/UL — LOW (ref 1–3.3)
LYMPHOCYTES # BLD AUTO: 11.3 % — LOW (ref 13–44)
MCHC RBC-ENTMCNC: 22.1 PG — LOW (ref 27–34)
MCHC RBC-ENTMCNC: 32.5 GM/DL — SIGNIFICANT CHANGE UP (ref 32–36)
MCV RBC AUTO: 68 FL — LOW (ref 80–100)
MONOCYTES # BLD AUTO: 0.21 K/UL — SIGNIFICANT CHANGE UP (ref 0–0.9)
MONOCYTES NFR BLD AUTO: 2.7 % — SIGNIFICANT CHANGE UP (ref 2–14)
NEUTROPHILS # BLD AUTO: 6.67 K/UL — SIGNIFICANT CHANGE UP (ref 1.8–7.4)
NEUTROPHILS NFR BLD AUTO: 85.4 % — HIGH (ref 43–77)
NRBC # BLD: 0 /100 WBCS — SIGNIFICANT CHANGE UP (ref 0–0)
PLATELET # BLD AUTO: 157 K/UL — SIGNIFICANT CHANGE UP (ref 150–400)
POTASSIUM SERPL-MCNC: 3 MMOL/L — LOW (ref 3.5–5.3)
POTASSIUM SERPL-MCNC: 3 MMOL/L — LOW (ref 3.5–5.3)
POTASSIUM SERPL-SCNC: 3 MMOL/L — LOW (ref 3.5–5.3)
POTASSIUM SERPL-SCNC: 3 MMOL/L — LOW (ref 3.5–5.3)
PROT SERPL-MCNC: 8.4 G/DL — HIGH (ref 6–8.3)
RBC # BLD: 5.66 M/UL — HIGH (ref 3.8–5.2)
RBC # FLD: 13.3 % — SIGNIFICANT CHANGE UP (ref 10.3–14.5)
SARS-COV-2 RNA SPEC QL NAA+PROBE: SIGNIFICANT CHANGE UP
SODIUM SERPL-SCNC: 128 MMOL/L — LOW (ref 135–145)
SODIUM SERPL-SCNC: 130 MMOL/L — LOW (ref 135–145)
TROPONIN I, HIGH SENSITIVITY RESULT: 101.4 NG/L — HIGH
TROPONIN I, HIGH SENSITIVITY RESULT: 130.5 NG/L — HIGH
TROPONIN I, HIGH SENSITIVITY RESULT: 96.2 NG/L — HIGH
WBC # BLD: 7.8 K/UL — SIGNIFICANT CHANGE UP (ref 3.8–10.5)
WBC # FLD AUTO: 7.8 K/UL — SIGNIFICANT CHANGE UP (ref 3.8–10.5)

## 2022-07-16 PROCEDURE — 99285 EMERGENCY DEPT VISIT HI MDM: CPT

## 2022-07-16 PROCEDURE — 93010 ELECTROCARDIOGRAM REPORT: CPT

## 2022-07-16 PROCEDURE — 70450 CT HEAD/BRAIN W/O DYE: CPT | Mod: 26,MA

## 2022-07-16 RX ORDER — POTASSIUM CHLORIDE 20 MEQ
20 PACKET (EA) ORAL ONCE
Refills: 0 | Status: DISCONTINUED | OUTPATIENT
Start: 2022-07-16 | End: 2022-07-16

## 2022-07-16 RX ORDER — SODIUM CHLORIDE 9 MG/ML
1000 INJECTION INTRAMUSCULAR; INTRAVENOUS; SUBCUTANEOUS
Refills: 0 | Status: DISCONTINUED | OUTPATIENT
Start: 2022-07-16 | End: 2022-07-16

## 2022-07-16 RX ORDER — ONDANSETRON 8 MG/1
4 TABLET, FILM COATED ORAL ONCE
Refills: 0 | Status: COMPLETED | OUTPATIENT
Start: 2022-07-16 | End: 2022-07-16

## 2022-07-16 RX ORDER — ONDANSETRON 8 MG/1
1 TABLET, FILM COATED ORAL
Qty: 0 | Refills: 0 | DISCHARGE

## 2022-07-16 RX ORDER — POTASSIUM CHLORIDE 20 MEQ
20 PACKET (EA) ORAL
Refills: 0 | Status: COMPLETED | OUTPATIENT
Start: 2022-07-16 | End: 2022-07-17

## 2022-07-16 RX ORDER — POTASSIUM CHLORIDE 20 MEQ
10 PACKET (EA) ORAL ONCE
Refills: 0 | Status: COMPLETED | OUTPATIENT
Start: 2022-07-16 | End: 2022-07-16

## 2022-07-16 RX ORDER — ATORVASTATIN CALCIUM 80 MG/1
20 TABLET, FILM COATED ORAL AT BEDTIME
Refills: 0 | Status: DISCONTINUED | OUTPATIENT
Start: 2022-07-16 | End: 2022-07-17

## 2022-07-16 RX ORDER — MECLIZINE HCL 12.5 MG
1 TABLET ORAL
Qty: 0 | Refills: 0 | DISCHARGE

## 2022-07-16 RX ORDER — PANTOPRAZOLE SODIUM 20 MG/1
40 TABLET, DELAYED RELEASE ORAL
Refills: 0 | Status: DISCONTINUED | OUTPATIENT
Start: 2022-07-16 | End: 2022-07-18

## 2022-07-16 RX ORDER — ONDANSETRON 8 MG/1
4 TABLET, FILM COATED ORAL EVERY 6 HOURS
Refills: 0 | Status: DISCONTINUED | OUTPATIENT
Start: 2022-07-16 | End: 2022-07-18

## 2022-07-16 RX ORDER — MECLIZINE HCL 12.5 MG
25 TABLET ORAL EVERY 12 HOURS
Refills: 0 | Status: DISCONTINUED | OUTPATIENT
Start: 2022-07-16 | End: 2022-07-18

## 2022-07-16 RX ORDER — MECLIZINE HCL 12.5 MG
25 TABLET ORAL ONCE
Refills: 0 | Status: COMPLETED | OUTPATIENT
Start: 2022-07-16 | End: 2022-07-16

## 2022-07-16 RX ORDER — ASPIRIN/CALCIUM CARB/MAGNESIUM 324 MG
81 TABLET ORAL ONCE
Refills: 0 | Status: COMPLETED | OUTPATIENT
Start: 2022-07-16 | End: 2022-07-16

## 2022-07-16 RX ORDER — ASPIRIN/CALCIUM CARB/MAGNESIUM 324 MG
81 TABLET ORAL DAILY
Refills: 0 | Status: DISCONTINUED | OUTPATIENT
Start: 2022-07-16 | End: 2022-07-18

## 2022-07-16 RX ORDER — POTASSIUM CHLORIDE 20 MEQ
20 PACKET (EA) ORAL ONCE
Refills: 0 | Status: COMPLETED | OUTPATIENT
Start: 2022-07-16 | End: 2022-07-16

## 2022-07-16 RX ORDER — HYDRALAZINE HCL 50 MG
1 TABLET ORAL
Qty: 0 | Refills: 0 | DISCHARGE

## 2022-07-16 RX ORDER — SODIUM CHLORIDE 9 MG/ML
1000 INJECTION INTRAMUSCULAR; INTRAVENOUS; SUBCUTANEOUS ONCE
Refills: 0 | Status: COMPLETED | OUTPATIENT
Start: 2022-07-16 | End: 2022-07-16

## 2022-07-16 RX ORDER — HYDRALAZINE HCL 50 MG
50 TABLET ORAL THREE TIMES A DAY
Refills: 0 | Status: DISCONTINUED | OUTPATIENT
Start: 2022-07-16 | End: 2022-07-18

## 2022-07-16 RX ORDER — ENOXAPARIN SODIUM 100 MG/ML
40 INJECTION SUBCUTANEOUS EVERY 24 HOURS
Refills: 0 | Status: DISCONTINUED | OUTPATIENT
Start: 2022-07-16 | End: 2022-07-17

## 2022-07-16 RX ORDER — SODIUM CHLORIDE 9 MG/ML
1000 INJECTION INTRAMUSCULAR; INTRAVENOUS; SUBCUTANEOUS
Refills: 0 | Status: DISCONTINUED | OUTPATIENT
Start: 2022-07-16 | End: 2022-07-18

## 2022-07-16 RX ORDER — NIFEDIPINE 30 MG
90 TABLET, EXTENDED RELEASE 24 HR ORAL DAILY
Refills: 0 | Status: DISCONTINUED | OUTPATIENT
Start: 2022-07-16 | End: 2022-07-18

## 2022-07-16 RX ADMIN — Medication 25 MILLIGRAM(S): at 15:39

## 2022-07-16 RX ADMIN — SODIUM CHLORIDE 75 MILLILITER(S): 9 INJECTION INTRAMUSCULAR; INTRAVENOUS; SUBCUTANEOUS at 23:35

## 2022-07-16 RX ADMIN — Medication 100 MILLIEQUIVALENT(S): at 23:54

## 2022-07-16 RX ADMIN — ATORVASTATIN CALCIUM 20 MILLIGRAM(S): 80 TABLET, FILM COATED ORAL at 23:35

## 2022-07-16 RX ADMIN — Medication 20 MILLIEQUIVALENT(S): at 23:36

## 2022-07-16 RX ADMIN — SODIUM CHLORIDE 1000 MILLILITER(S): 9 INJECTION INTRAMUSCULAR; INTRAVENOUS; SUBCUTANEOUS at 15:39

## 2022-07-16 RX ADMIN — Medication 81 MILLIGRAM(S): at 19:46

## 2022-07-16 RX ADMIN — Medication 20 MILLIEQUIVALENT(S): at 18:49

## 2022-07-16 RX ADMIN — ONDANSETRON 4 MILLIGRAM(S): 8 TABLET, FILM COATED ORAL at 15:39

## 2022-07-16 RX ADMIN — SODIUM CHLORIDE 1000 MILLILITER(S): 9 INJECTION INTRAMUSCULAR; INTRAVENOUS; SUBCUTANEOUS at 16:16

## 2022-07-16 NOTE — H&P ADULT - PROBLEM SELECTOR PLAN 5
Trop 96>100  f/u Trop 3  EKG NSR unchanged from prior EKG  pt not c/o chest pain  cardio consulted Dr. Lomeli  f/u ECHO

## 2022-07-16 NOTE — ED PROVIDER NOTE - NSTIMEPROVIDERCAREINITIATE_GEN_ER
Emergency Department Physician Note    Patient: Ambrose Guadarrama Age: 39 year old Sex: male   MRN: [id number] : 1982 Encounter Date: 2022       HISTORY OF PRESENT ILLNESS  This is a 39 year old male past medical history of diabetes presenting to the emergency department for chest and abdominal pain.  Patient states that he began noticing a burning pain from his epigastrium into his chest, had several episodes of nonbloody nonbilious emesis.  Pain did not let up today and he called an ambulance complaining of chest pain.  No shortness of breath no lightheadedness or dizziness, no lower abdominal pain diarrhea or dysuria.  No swelling in his legs.  No previous history of any cardiac disease.  Does not take anything for his diabetes since he has been feeling this way    REVIEW OF SYSTEMS   Constitutional:  No fever, no chills   Skin:  No rash, no skin changes    Eye:  No redness, no vision changes   ENT:  No rhinorrhea, no sore throat   Respiratory:  No cough, no shortness of breath   Cardiovascular:  No chest pain, no swelling     Genitourinary:  No pain, no changes in urination   Musculoskeletal:  No weakness, no pain   Neurologic:  No headache, no dizziness         PAST HISTORY         Past Medical History:   Diagnosis Date   • Diabetes mellitus (CMS/HCC)    • Gastroesophageal reflux disease    • Neuropathy     Past Surgical History:   Procedure Laterality Date   • ABDOMEN SURGERY     • COLON SURGERY               Social History     Tobacco Use   • Smoking status: Never Smoker   • Smokeless tobacco: Never Used   Vaping Use   • Vaping Use: never used   Substance Use Topics   • Alcohol use: Never   • Drug use: Never    No family history on file.    No Known Allergies        Additional Information:     PHYSICAL EXAMINATION  ED Triage Vitals   BP    Pulse    Resp    Temp    SpO2        GEN: Patient is generally well-appearing, in no acute distress  HEENT: Pupils equally round and reactive to light  bilaterally, extraocular muscles intact, moist mucous membranes  CV: Regular rate and rhythm, no murmurs rubs or gallops, no extra heart sounds  PULM: No increased work of breathing, lungs are clear to auscultation bilaterally  GI: Abdomen is soft, tender palpation in the epigastrium and right upper quadrant, negative Curtis sign  : No suprapubic tenderness, no CVA tenderness  MSK: No clear bony abnormalities  SKIN: No obvious lesions, no ecchymoses  NEURO: Patient is spontaneously moving all limbs with no focal neurologic abnormalities  PSYCH: Patient converses appropriately, displays appropriate mood    Imaging:   XR CHEST PA OR AP 1 VIEW   Final Result   FINDINGS an IMPRESSION:    Normal AP view of chest with EKG.                  Electronically Signed by: BREA NAVARRO M.D.    Signed on: 3/25/2022 9:27 AM          US LIVER GALLBLADDER PANCREAS   Final Result       Ultrasound liver, gallbladder and pancreas demonstrates no significant   abnormality.         Electronically Signed by: JUJU LALA M.D.    Signed on: 3/25/2022 8:22 AM                MEDICAL DECISION MAKING  This is a 39 year old male past medical history of diabetes evaluated for epigastric right upper quadrant abdominal pain and chest  Patient is well-appearing, nontoxic  Vital signs are  within normal limits  Differential diagnosis includes but is not limited to ACS, pancreatitis, cholecystitis/cholelithiasis, GERD, costochondritis.  Low concern for aortic dissection, will evaluate with bedside  Plan on evaluation with labs, EKG, chest x-ray, lipase    ED Course as of 03/25/22 1140   Fri Mar 25, 2022   0713 EKG normal sinus rhythm 92 bpm, normal axis, no acute ST segment changes, no T wave inversions,  [TA]   0740 VBG with no evidence of acidosis [TA]   0854 CMP with pseudohyponatremia, anion gap of 22 however bicarb is normal.  Overall does not support DKA.  Given IV fluids, pain control.  Will reevaluate [TA]   0906 Ultrasound  gallbladder with no significant abnormality [TA]   1054 Urinalysis negative for infection, given insulin, repeat lactate pending.  Patient is tolerating p.o. [TA]   1138 Patient left his room and stated that he had to go to keep an appointment.  Advised him to stay for repeat vitals and to ensure that he is tolerating p.o.  He states that he is tolerating p.o. and that he has to leave, wanted a prescription for gastritis for future episodes.  Advised him to check his blood sugar as he was given 10 units of insulin.  Repeat lactate negative. Pt did not stay to sign out AMA.  [TA]      ED Course User Index  [TA] Noelle Rubio MD         IMPRESSION AND PLAN  ED Diagnosis   1. Other acute gastritis without hemorrhage       Disposition: Left prior to dispo     I participated in the care of this patient and this note provides additional information regarding their visit. This note may have been created using voice dictation technology and may include inadvertent errors.      Keila Rubio MD  Emergency Medicine        Noelle Rubio MD  03/25/22 2831     16-Jul-2022 15:03

## 2022-07-16 NOTE — H&P ADULT - HISTORY OF PRESENT ILLNESS
66 year old female with a past medical history of HTN and CVA  66 year old female with a past medical history of HTN and CVA 6 years ago coming to ED c/o dizziness. Patients son at bedside stating that his mother woke up yesterday and felt dizzy. She attempted to get out of bed but was unable to. She also endorses feeling nausea and had 2 episodes of non bloody non bilious vomiting. Patient has been unable to eat or drink since the onset of symptoms. Denies any headaches, dizziness, fevers, chills, chest pain, cough, SOB, abd pain, diarrhea, constipation hematuria dysuria, numbness, and weakness.    In ED VS:  T 97.8, HR 68, /81, RR 17, Spo2 100% RA  Trop 96>100  Na 128, K 3   CT head negative acute changes   Was given 1L NS in ED

## 2022-07-16 NOTE — H&P ADULT - PROBLEM SELECTOR PLAN 3
Na 128 in ED S/p 1LR NS bolus  f/u repeat Na, monitor for over correction Na 128 in ED S/p 1LR NS bolus  f/u repeat Na, monitor for over correction  nephrology Dr. Rivera consulted

## 2022-07-16 NOTE — ED PROVIDER NOTE - PHYSICAL EXAMINATION
GENERAL: well appearing, no acute distress   HEAD: atraumatic   EYES: EOMI   ENT: moist oral mucosa   CARDIAC: regular rate, no edema   RESPIRATORY: no increased work of breathing, lungs CTAB  ABDO: soft NTND  MUSCULOSKELETAL: no deformity   NEUROLOGICAL: AAOx3, CN's II-XII intact, strength 5/5 bilateral UE and LE, sensation intact to light touch, finger to nose intact, unsteady gait  SKIN: no visible rash  PSYCHIATRIC: cooperative

## 2022-07-16 NOTE — H&P ADULT - PROBLEM SELECTOR PLAN 1
CT head negative  EKG NSR, unchanged from prior EKG in 12/2021  s/p meclizine in Ed  c/w meclizine  Neuro consulted Dr. Hemphill  Cardio consulted Dr. Lomeli  cardiac tele

## 2022-07-16 NOTE — ED PROVIDER NOTE - CLINICAL SUMMARY MEDICAL DECISION MAKING FREE TEXT BOX
67 yo F with non specific dizziness  Unsteady gait on exam  labs - trop elevated x 2  EKG - nsr, rate 71, QTc 506  CT head - no ICH  Not improving after meds  Needs admission  PCP not on admitting list  Endorsed to unattached Dr Brownlee and MAR

## 2022-07-16 NOTE — ED ADULT NURSE NOTE - OBJECTIVE STATEMENT
Patient presents with son reports having dizziness, nausea multiple episode of vomiting onset yesterday , denies chest pain SOB

## 2022-07-16 NOTE — H&P ADULT - ATTENDING COMMENTS
Seen and examined around 8PM 07/16 with son in room. 66 years old female with  PMH of HTN & HLD came to ED for Dizziness with Vomiting since yesterday. O/E : Vitals stable with no focal deficit or nystagmus . Labs show Hyponatremia and S/P 1L NaCl so rpting stat BMP.  Renal consulted . CT Head showing abnormality so needs MRI as well MRA and TTE  . Will consult Neurology . Seen and examined around 8PM 07/16 with son in room. 66 years old female with  PMH of HTN & HLD came to ED for Dizziness with Vomiting since yesterday. O/E : Vitals stable with no focal deficit or nystagmus . Labs show Hyponatremia & Hypokalemia . S/P 1L NaCl so rpting stat BMP.  Renal consulted . CT Head showing abnormality so needs MRI as well MRA and TTE  . Will consult Neurology . Seen and examined around 8PM 07/16 with son in room. 66 years old female with  PMH of HTN & HLD came to ED for Dizziness with Vomiting since yesterday. O/E : Vitals stable with no focal deficit or nystagmus . Labs show Hyponatremia & Hypokalemia . S/P 1L NaCl so rpting stat BMP.  Renal consulted . CT Head showing abnormality so needs MRI as well MRA and TTE  . Will consult Neurology . Chest Pain with elevated troponin so cardiology consulted  . Seen and examined around 8PM 07/16 with son in room. 66 years old female with  PMH of HTN & HLD came to ED for Dizziness with Vomiting since yesterday. O/E : Vitals stable with no focal deficit or nystagmus . Labs show Hyponatremia & Hypokalemia . S/P 1L NaCl so rpting stat BMP.  Renal consulted . CT Head showing abnormality so needs MRI as well MRA and TTE  . Will consult Neurology . Chest Pain with elevated troponin so cardiology consulted .

## 2022-07-16 NOTE — ED PROVIDER NOTE - OBJECTIVE STATEMENT
65 yo F pmh of stroke, HTN and HLD presents with dizziness since yesterday with vomiting and chest discomfort. Pt is a limited historian despite repeated questioning she cannot qualify her dizziness or chest symptoms. Denies other acute complaints. Translation via son at bedside.

## 2022-07-16 NOTE — H&P ADULT - ASSESSMENT
66 year old female with a past medical history of HTN and CVA 6 years ago coming to ED c/o dizziness, admitted for CVA rule out.

## 2022-07-16 NOTE — PHARMACOTHERAPY INTERVENTION NOTE - COMMENTS
Confirmed with MD that patient only has a peripheral line. Changed K-rider from 20meq/100 ml to 10meq/100ml.

## 2022-07-17 DIAGNOSIS — R42 DIZZINESS AND GIDDINESS: ICD-10-CM

## 2022-07-17 DIAGNOSIS — R07.9 CHEST PAIN, UNSPECIFIED: ICD-10-CM

## 2022-07-17 DIAGNOSIS — I69.30 UNSPECIFIED SEQUELAE OF CEREBRAL INFARCTION: ICD-10-CM

## 2022-07-17 LAB
A1C WITH ESTIMATED AVERAGE GLUCOSE RESULT: 6.1 % — HIGH (ref 4–5.6)
ACANTHOCYTES BLD QL SMEAR: SIGNIFICANT CHANGE UP
ALBUMIN SERPL ELPH-MCNC: 3.2 G/DL — LOW (ref 3.5–5)
ALP SERPL-CCNC: 59 U/L — SIGNIFICANT CHANGE UP (ref 40–120)
ALT FLD-CCNC: 30 U/L DA — SIGNIFICANT CHANGE UP (ref 10–60)
ANION GAP SERPL CALC-SCNC: 8 MMOL/L — SIGNIFICANT CHANGE UP (ref 5–17)
ANISOCYTOSIS BLD QL: SLIGHT — SIGNIFICANT CHANGE UP
APTT BLD: >200 SEC — CRITICAL HIGH (ref 27.5–35.5)
AST SERPL-CCNC: 22 U/L — SIGNIFICANT CHANGE UP (ref 10–40)
BILIRUB SERPL-MCNC: 0.4 MG/DL — SIGNIFICANT CHANGE UP (ref 0.2–1.2)
BITE CELLS BLD QL SMEAR: PRESENT — SIGNIFICANT CHANGE UP
BUN SERPL-MCNC: 14 MG/DL — SIGNIFICANT CHANGE UP (ref 7–18)
CALCIUM SERPL-MCNC: 8.8 MG/DL — SIGNIFICANT CHANGE UP (ref 8.4–10.5)
CHLORIDE SERPL-SCNC: 104 MMOL/L — SIGNIFICANT CHANGE UP (ref 96–108)
CHOLEST SERPL-MCNC: 190 MG/DL — SIGNIFICANT CHANGE UP
CK MB BLD-MCNC: 1.9 % — SIGNIFICANT CHANGE UP (ref 0–3.5)
CK MB CFR SERPL CALC: 2.3 NG/ML — SIGNIFICANT CHANGE UP (ref 0–3.6)
CK SERPL-CCNC: 118 U/L — SIGNIFICANT CHANGE UP (ref 21–215)
CO2 SERPL-SCNC: 24 MMOL/L — SIGNIFICANT CHANGE UP (ref 22–31)
CREAT SERPL-MCNC: 0.85 MG/DL — SIGNIFICANT CHANGE UP (ref 0.5–1.3)
DACRYOCYTES BLD QL SMEAR: SLIGHT — SIGNIFICANT CHANGE UP
EGFR: 75 ML/MIN/1.73M2 — SIGNIFICANT CHANGE UP
ELLIPTOCYTES BLD QL SMEAR: SLIGHT — SIGNIFICANT CHANGE UP
ESTIMATED AVERAGE GLUCOSE: 128 MG/DL — HIGH (ref 68–114)
GLUCOSE SERPL-MCNC: 90 MG/DL — SIGNIFICANT CHANGE UP (ref 70–99)
HCT VFR BLD CALC: 35.8 % — SIGNIFICANT CHANGE UP (ref 34.5–45)
HCT VFR BLD CALC: 36.7 % — SIGNIFICANT CHANGE UP (ref 34.5–45)
HDLC SERPL-MCNC: 66 MG/DL — SIGNIFICANT CHANGE UP
HGB BLD-MCNC: 11.5 G/DL — SIGNIFICANT CHANGE UP (ref 11.5–15.5)
HGB BLD-MCNC: 11.7 G/DL — SIGNIFICANT CHANGE UP (ref 11.5–15.5)
LIPID PNL WITH DIRECT LDL SERPL: 114 MG/DL — HIGH
MAGNESIUM SERPL-MCNC: 2.4 MG/DL — SIGNIFICANT CHANGE UP (ref 1.6–2.6)
MANUAL SMEAR VERIFICATION: SIGNIFICANT CHANGE UP
MCHC RBC-ENTMCNC: 21.7 PG — LOW (ref 27–34)
MCHC RBC-ENTMCNC: 22.2 PG — LOW (ref 27–34)
MCHC RBC-ENTMCNC: 31.9 GM/DL — LOW (ref 32–36)
MCHC RBC-ENTMCNC: 32.1 GM/DL — SIGNIFICANT CHANGE UP (ref 32–36)
MCV RBC AUTO: 67.4 FL — LOW (ref 80–100)
MCV RBC AUTO: 69.8 FL — LOW (ref 80–100)
NON HDL CHOLESTEROL: 124 MG/DL — SIGNIFICANT CHANGE UP
NRBC # BLD: 0 /100 WBCS — SIGNIFICANT CHANGE UP (ref 0–0)
NRBC # BLD: 0 /100 WBCS — SIGNIFICANT CHANGE UP (ref 0–0)
OSMOLALITY SERPL: 283 MOSMOL/KG — SIGNIFICANT CHANGE UP (ref 280–301)
OVALOCYTES BLD QL SMEAR: SLIGHT — SIGNIFICANT CHANGE UP
PHOSPHATE SERPL-MCNC: 3 MG/DL — SIGNIFICANT CHANGE UP (ref 2.5–4.5)
PLAT MORPH BLD: NORMAL — SIGNIFICANT CHANGE UP
PLATELET # BLD AUTO: 166 K/UL — SIGNIFICANT CHANGE UP (ref 150–400)
PLATELET # BLD AUTO: 168 K/UL — SIGNIFICANT CHANGE UP (ref 150–400)
POIKILOCYTOSIS BLD QL AUTO: SLIGHT — SIGNIFICANT CHANGE UP
POTASSIUM SERPL-MCNC: 3.9 MMOL/L — SIGNIFICANT CHANGE UP (ref 3.5–5.3)
POTASSIUM SERPL-SCNC: 3.9 MMOL/L — SIGNIFICANT CHANGE UP (ref 3.5–5.3)
PROT SERPL-MCNC: 7.3 G/DL — SIGNIFICANT CHANGE UP (ref 6–8.3)
RBC # BLD: 5.26 M/UL — HIGH (ref 3.8–5.2)
RBC # BLD: 5.31 M/UL — HIGH (ref 3.8–5.2)
RBC # FLD: 13.7 % — SIGNIFICANT CHANGE UP (ref 10.3–14.5)
RBC # FLD: 13.8 % — SIGNIFICANT CHANGE UP (ref 10.3–14.5)
RBC BLD AUTO: ABNORMAL
SODIUM SERPL-SCNC: 136 MMOL/L — SIGNIFICANT CHANGE UP (ref 135–145)
TRIGL SERPL-MCNC: 48 MG/DL — SIGNIFICANT CHANGE UP
TROPONIN I, HIGH SENSITIVITY RESULT: 131.7 NG/L — HIGH
TROPONIN I, HIGH SENSITIVITY RESULT: 159.7 NG/L — HIGH
TROPONIN I, HIGH SENSITIVITY RESULT: 161.8 NG/L — HIGH
TSH SERPL-MCNC: 3.18 UU/ML — SIGNIFICANT CHANGE UP (ref 0.34–4.82)
WBC # BLD: 6.29 K/UL — SIGNIFICANT CHANGE UP (ref 3.8–10.5)
WBC # BLD: 6.85 K/UL — SIGNIFICANT CHANGE UP (ref 3.8–10.5)
WBC # FLD AUTO: 6.29 K/UL — SIGNIFICANT CHANGE UP (ref 3.8–10.5)
WBC # FLD AUTO: 6.85 K/UL — SIGNIFICANT CHANGE UP (ref 3.8–10.5)

## 2022-07-17 PROCEDURE — 93010 ELECTROCARDIOGRAM REPORT: CPT

## 2022-07-17 PROCEDURE — 99223 1ST HOSP IP/OBS HIGH 75: CPT

## 2022-07-17 RX ORDER — HEPARIN SODIUM 5000 [USP'U]/ML
2000 INJECTION INTRAVENOUS; SUBCUTANEOUS EVERY 6 HOURS
Refills: 0 | Status: DISCONTINUED | OUTPATIENT
Start: 2022-07-17 | End: 2022-07-18

## 2022-07-17 RX ORDER — HEPARIN SODIUM 5000 [USP'U]/ML
4500 INJECTION INTRAVENOUS; SUBCUTANEOUS ONCE
Refills: 0 | Status: DISCONTINUED | OUTPATIENT
Start: 2022-07-17 | End: 2022-07-18

## 2022-07-17 RX ORDER — HEPARIN SODIUM 5000 [USP'U]/ML
INJECTION INTRAVENOUS; SUBCUTANEOUS
Qty: 25000 | Refills: 0 | Status: DISCONTINUED | OUTPATIENT
Start: 2022-07-17 | End: 2022-07-18

## 2022-07-17 RX ORDER — ATORVASTATIN CALCIUM 80 MG/1
40 TABLET, FILM COATED ORAL AT BEDTIME
Refills: 0 | Status: DISCONTINUED | OUTPATIENT
Start: 2022-07-17 | End: 2022-07-18

## 2022-07-17 RX ORDER — HEPARIN SODIUM 5000 [USP'U]/ML
4500 INJECTION INTRAVENOUS; SUBCUTANEOUS EVERY 6 HOURS
Refills: 0 | Status: DISCONTINUED | OUTPATIENT
Start: 2022-07-17 | End: 2022-07-18

## 2022-07-17 RX ADMIN — Medication 25 MILLIGRAM(S): at 06:47

## 2022-07-17 RX ADMIN — Medication 50 MILLIGRAM(S): at 19:06

## 2022-07-17 RX ADMIN — HEPARIN SODIUM 4500 UNIT(S): 5000 INJECTION INTRAVENOUS; SUBCUTANEOUS at 14:10

## 2022-07-17 RX ADMIN — Medication 20 MILLIEQUIVALENT(S): at 01:22

## 2022-07-17 RX ADMIN — HEPARIN SODIUM 0 UNIT(S)/HR: 5000 INJECTION INTRAVENOUS; SUBCUTANEOUS at 20:55

## 2022-07-17 RX ADMIN — Medication 25 MILLIGRAM(S): at 19:06

## 2022-07-17 RX ADMIN — HEPARIN SODIUM 1100 UNIT(S)/HR: 5000 INJECTION INTRAVENOUS; SUBCUTANEOUS at 13:59

## 2022-07-17 RX ADMIN — PANTOPRAZOLE SODIUM 40 MILLIGRAM(S): 20 TABLET, DELAYED RELEASE ORAL at 06:48

## 2022-07-17 RX ADMIN — Medication 50 MILLIGRAM(S): at 06:47

## 2022-07-17 RX ADMIN — HEPARIN SODIUM 1100 UNIT(S)/HR: 5000 INJECTION INTRAVENOUS; SUBCUTANEOUS at 19:18

## 2022-07-17 RX ADMIN — Medication 81 MILLIGRAM(S): at 13:26

## 2022-07-17 RX ADMIN — HEPARIN SODIUM 900 UNIT(S)/HR: 5000 INJECTION INTRAVENOUS; SUBCUTANEOUS at 21:57

## 2022-07-17 RX ADMIN — Medication 90 MILLIGRAM(S): at 06:47

## 2022-07-17 RX ADMIN — ENOXAPARIN SODIUM 40 MILLIGRAM(S): 100 INJECTION SUBCUTANEOUS at 06:45

## 2022-07-17 NOTE — CONSULT NOTE ADULT - SUBJECTIVE AND OBJECTIVE BOX
***TEMPLATE ONLY***      Patient is a 66y old  Female who presents with a chief complaint of dizziness (16 Jul 2022 22:12)      HPI:  66 year old female with a past medical history of HTN and CVA 6 years ago coming to ED c/o dizziness. Patients son at bedside stating that his mother woke up yesterday and felt dizzy. She attempted to get out of bed but was unable to. She also endorses feeling nausea and had 2 episodes of non bloody non bilious vomiting. Patient has been unable to eat or drink since the onset of symptoms. Denies any headaches, dizziness, fevers, chills, chest pain, cough, SOB, abd pain, diarrhea, constipation hematuria dysuria, numbness, and weakness.    In ED VS:  T 97.8, HR 68, /81, RR 17, Spo2 100% RA  Trop 96>100  Na 128, K 3   CT head negative acute changes   Was given 1L NS in ED (16 Jul 2022 22:12)           The patient was last know well at  The patient lives at home/ NH.  The patient walks without assistance/ with a cane or walker    Neurological Review of Systems:  No difficulty with language.  No vision loss or double vision.  No dizziness, vertigo or new hearing loss.  No difficulty with speech or swallowing.  No focal weakness.  No focal sensory changes.  No numbness or tingling in the bilateral lower extremities.  No difficulty with balance.  No difficulty with ambulation.      MEDICATIONS  (STANDING):  aspirin  chewable 81 milliGRAM(s) Oral daily  atorvastatin 20 milliGRAM(s) Oral at bedtime  enoxaparin Injectable 40 milliGRAM(s) SubCutaneous every 24 hours  hydrALAZINE 50 milliGRAM(s) Oral three times a day  meclizine 25 milliGRAM(s) Oral every 12 hours  NIFEdipine XL 90 milliGRAM(s) Oral daily  pantoprazole    Tablet 40 milliGRAM(s) Oral before breakfast  sodium chloride 0.9%. 1000 milliLiter(s) (75 mL/Hr) IV Continuous <Continuous>    MEDICATIONS  (PRN):  ondansetron Injectable 4 milliGRAM(s) IV Push every 6 hours PRN Nausea and/or Vomiting    Allergies    No Known Allergies    Intolerances      PAST MEDICAL & SURGICAL HISTORY:  Stroke      HTN (hypertension)      No significant past surgical history        FAMILY HISTORY:  No pertinent family history in first degree relatives      SOCIAL HISTORY: non smoker/ former smoker/ active smoker    Review of Systems:  Constitutional: No generalized weakness. No fevers or chills.                    Eyes, Ears, Mouth, Throat: No vision loss   Respiratory: No shortness of breath or cough.                                Cardiovascular: No chest pain or palpitations  Gastrointestinal: No nausea or vomiting.                                         Genitourinary: No urinary incontinence or burning on urination.  Musculoskeletal: No joint pain.                                                           Dermatologic: No rash.  Neurological: as per HPI                                                                      Psychiatric: No behavioral problems.  Endocrine: No known hypoglycemia.               Hematologic/Lymphatic: No easy bleeding.    O:  Vital Signs Last 24 Hrs  T(C): 37.1 (17 Jul 2022 11:09), Max: 37.1 (17 Jul 2022 07:18)  T(F): 98.7 (17 Jul 2022 11:09), Max: 98.7 (17 Jul 2022 07:18)  HR: 58 (17 Jul 2022 11:09) (44 - 68)  BP: 94/50 (17 Jul 2022 11:09) (94/50 - 181/81)  BP(mean): --  RR: 16 (17 Jul 2022 11:09) (16 - 18)  SpO2: 95% (17 Jul 2022 11:09) (95% - 100%)    Parameters below as of 17 Jul 2022 11:09  Patient On (Oxygen Delivery Method): room air        General Exam:   General appearance: No acute distress                 Cardiovascular: Pedal dorsalis pulses intact bilaterally    Neurological Exam:  NIH Stroke Scale (NIHSS):   1a. LOConscious:  0-alert 1-lethargy 2-obtund 3-coma:    _____  1b. LOC Questions:  0-both 1-one 2-none                       _____  1c. LOC Commands:  0-both 1-one 2-none                     _____  2.   Gaze:  0-nl 1-partial 2-conjugate                                _____  3.   Visual:  0-nl 1-part kristopher 2-full kristopher 3-bilat kristopher         _____  4.   Facial Palsy:  0-nl 1-minor 2-part 3-complete             _____  5.   Motor Arm:  0-nl 1-drift 2-effort 3-no effort         Left             _____                              4-no move UN-amputated                     Right  _____  6.   Motor Leg:                                                                 Left   _____                                                                                   Right _____  7.   Ataxia:  0-nl 1-one limb 2-two UN-amp                      _____  8.   Sensory:  0-nl 1-mild 2-severe                                  _____  9.   Language:  0-nl 1-mild 2-severe 3-mute                     _____  10.  Dysarthria:  0-nl 1-mild 2-severe 3-barrier                  _____  11.  Extinction/Inattention:  0-nl 1-mild 2-deep                 _____         TOTAL NIHSS       ________    Mental Status: Orientated to self, date and place.  Attention intact.  No dysarthria, aphasia or neglect.  Knowledge intact.  Registration intact.  Short and long term memory grossly intact.      Cranial Nerves: CN I - not tested.  PERRL, EOMI, VFF, no nystagmus or diplopia.  No APD.  Fundi not visualized bilaterally.  CN V1-3 intact to light touch and pinprick.  No facial asymmetry.  Hearing intact to finger rub bilaterally.  Tongue, uvula and palate midline.  Sternocleidomastoid and Trapezius intact bilaterally.    Motor:   Tone: normal.                  Strength intact throughout  No pronator drift bilaterally                      No dysmetria on finger-nose-finger or heel-shin-heel  No truncal ataxia.  No resting, postural or action tremor.  No myoclonus.    Sensation: intact to light touch, pinprick, vibration and proprioception    Deep Tendon Reflexes: 1+ bilateral biceps, triceps, brachioradialis, knee and ankle  Toes flexor bilaterally    Gait: normal and stable.  Rhomberg -nivia.    Other:      LABS:                        11.5   6.85  )-----------( 168      ( 17 Jul 2022 06:52 )             35.8     07-17    136  |  104  |  14  ----------------------------<  90  3.9   |  24  |  0.85    Ca    8.8      17 Jul 2022 06:52  Phos  3.0     07-17  Mg     2.4     07-17    TPro  7.3  /  Alb  3.2<L>  /  TBili  0.4  /  DBili  x   /  AST  22  /  ALT  30  /  AlkPhos  59  07-17        LDL  HbA1C    RADIOLOGY & ADDITIONAL STUDIES:    EKG: ek< from: 12 Lead ECG (12.31.21 @ 05:08) >    Ventricular Rate 69 BPM    Atrial Rate 69 BPM    P-R Interval 162 ms    QRS Duration 106 ms    Q-T Interval 506 ms    QTC Calculation(Bazett) 542 ms    P Axis 75 degrees    R Axis 18 degrees    T Axis -52 degrees    Diagnosis Line *** Poor data quality, interpretation may be adversely affected  Normal sinus rhythm  Possible Left atrial enlargement  Left ventricular hypertrophy  ST & T wave abnormality, consider inferolateral ischemia  Abnormal ECG    Confirmed by BENNIE SALINAS ([id number]) on 1/9/2022 10:38:38 AM    < end of copied text >      < from: CT Head No Cont (07.16.22 @ 15:30) > (images reviewed)    ACC: [id number] EXAM:  CT BRAIN                          PROCEDURE DATE:  07/16/2022          INTERPRETATION:  Clinical indication: Dizziness    Technique:  Multiple axial sections were acquired from the base of the skull to the   vertex without contrast enhancement. Coronal and sagittal reconstructed   images were performed as well.    Findings:  The lateral ventricles have a normal configuration.    Parenchymal volume loss is seen    Abnormal areas of low-attenuation is seen in the periventricular white   matter region. These findings could be related to chronic microvascular   ischemic changes. Please correlate clinically.    Old lacunar infarct involving the left corona radiata region is   identified.    Pineal cyst is suspected. This finding measures approximately 9.8 mm.    There is no evidence of acute hemorrhage, mass or mass-effect in the   posterior fossa or in the supratentorial region.    Evaluation of the osseous structures with the appropriate window appears   unremarkable.    The visualized paranasal sinuses mastoid and middle ear regions appear   clear.    Impression:    Periventricular white matter lucencies described above    Pineal cyst is suspected.    --- End of Report ---            ANU CHAVARRIA MD; Attending Radiologist  This document has been electronically signed. Jul 16 2022  3:35PM    < end of copied text >      Impression:       Recommendations:  1.             Admit to telemetry   2.             MRI brain, MRA head without contrast, Carotid duplex (CD).  If unable to get MR imaging, please consider CTA head and neck in 24hours (no need for CD in this case).  If the patient is unable to get MR and unable to get IV contrast please repeat the CTH in 24hours and get a CD.  3.             TTE  4.             Please check HbA1C and fasting lipid profile  5.             Start ASA 81mg (or ASA 325mg rectally) and Lipitor 40mg HS  6.             BP goal of normal/ permissive HTN of SBP <200/<180<160  7.             NS at 125 cc/h/ D5 NS at 125 cc/hour, if NPO, if cardiac function allows, to ensure good perfusion  8.             Frequent neurochecks  9.             Urine Tox  10.          Able to resume normal diet as passed bedside swallowing test/ NPO for now  11.          Formal speech and swallow evaluation  12.          PT evaluation  13.          STAT CTH IF the patient has sudden change in mental status or neurological exam  14.          DVT PPx    Thank you for the courtesy of this consult.     interpretor [id number]    Patient is a 66y old  Female who presents with a chief complaint of dizziness (16 Jul 2022 22:12)      HPI:  66 year old female with a past medical history of HTN and CVA 6 years ago coming to ED c/o chest pain, neurology called for c/o dizziness/ vertigo.  The patient has vertigo associated with nausea and vomitting, episodic, occuring since 2017 when she had a stroke.  Symptoms were present yesterday but improved today.  Has chronic back pain as well.     The patient lives at home  The patient walks without assistancer    Neurological Review of Systems:  No difficulty with language.  No vision loss or double vision.  No new hearing loss.  No difficulty with speech or swallowing.  No focal weakness.  No focal sensory changes.  No numbness or tingling in the bilateral lower extremities.  No difficulty with balance.  + difficulty with ambulation.      MEDICATIONS  (STANDING):  aspirin  chewable 81 milliGRAM(s) Oral daily  atorvastatin 20 milliGRAM(s) Oral at bedtime  enoxaparin Injectable 40 milliGRAM(s) SubCutaneous every 24 hours  hydrALAZINE 50 milliGRAM(s) Oral three times a day  meclizine 25 milliGRAM(s) Oral every 12 hours  NIFEdipine XL 90 milliGRAM(s) Oral daily  pantoprazole    Tablet 40 milliGRAM(s) Oral before breakfast  sodium chloride 0.9%. 1000 milliLiter(s) (75 mL/Hr) IV Continuous <Continuous>    MEDICATIONS  (PRN):  ondansetron Injectable 4 milliGRAM(s) IV Push every 6 hours PRN Nausea and/or Vomiting    Allergies    No Known Allergies    Intolerances      PAST MEDICAL & SURGICAL HISTORY:  Stroke      HTN (hypertension)      No significant past surgical history        FAMILY HISTORY:  No pertinent family history in first degree relatives      SOCIAL HISTORY: non smoker    Review of Systems:  Constitutional: No fevers or chills.                    Eyes, Ears, Mouth, Throat: No vision loss   Respiratory: No cough.                                Cardiovascular: No palpitations  Gastrointestinal: + nausea or vomiting.                                         Genitourinary: No burning on urination.  Musculoskeletal: No joint pain.                                                           Dermatologic: No rash.  Neurological: as per HPI                                                                      Psychiatric: No behavioral problems.  Endocrine: No known hypoglycemia.               Hematologic/Lymphatic: No easy bleeding.    O:  Vital Signs Last 24 Hrs  T(C): 37.1 (17 Jul 2022 11:09), Max: 37.1 (17 Jul 2022 07:18)  T(F): 98.7 (17 Jul 2022 11:09), Max: 98.7 (17 Jul 2022 07:18)  HR: 58 (17 Jul 2022 11:09) (44 - 68)  BP: 94/50 (17 Jul 2022 11:09) (94/50 - 181/81)  BP(mean): --  RR: 16 (17 Jul 2022 11:09) (16 - 18)  SpO2: 95% (17 Jul 2022 11:09) (95% - 100%)    Parameters below as of 17 Jul 2022 11:09  Patient On (Oxygen Delivery Method): room air        General Exam:   General appearance: No acute distress                 Cardiovascular: Pedal dorsalis pulses intact bilaterally    Neurological Exam:        TOTAL NIHSS       __0______  MRS 1    Mental Status: Orientated to self, date and place.  Attention intact.  No dysarthria, aphasia or neglect.  Knowledge intact.  Registration intact.  Short and long term memory grossly intact.      Cranial Nerves: CN I - not tested.  PERRL, EOMI, VFF, no nystagmus or diplopia.  No APD.  Fundi not visualized bilaterally.  CN V1-3 intact to light touch.  No facial asymmetry.  Hearing intact to finger rub bilaterally.  Tongue, uvula and palate midline.  Sternocleidomastoid and Trapezius intact bilaterally.    Motor:   Tone: normal.                  Strength intact throughout  No pronator drift bilaterally                      No dysmetria on finger-nose-finger or heel-shin-heel  No truncal ataxia.  No resting, postural or action tremor.  No myoclonus.    Sensation: intact to light touch    Deep Tendon Reflexes: 1+ bilateral biceps, triceps, brachioradialis, knee and ankle  Toes flexor bilaterally    Gait: Antalgic gait    Other:      LABS:                        11.5   6.85  )-----------( 168      ( 17 Jul 2022 06:52 )             35.8     07-17    136  |  104  |  14  ----------------------------<  90  3.9   |  24  |  0.85    Ca    8.8      17 Jul 2022 06:52  Phos  3.0     07-17  Mg     2.4     07-17    TPro  7.3  /  Alb  3.2<L>  /  TBili  0.4  /  DBili  x   /  AST  22  /  ALT  30  /  AlkPhos  59  07-17        LDL  HbA1C    RADIOLOGY & ADDITIONAL STUDIES:    EKG: ek< from: 12 Lead ECG (12.31.21 @ 05:08) >    Ventricular Rate 69 BPM    Atrial Rate 69 BPM    P-R Interval 162 ms    QRS Duration 106 ms    Q-T Interval 506 ms    QTC Calculation(Bazett) 542 ms    P Axis 75 degrees    R Axis 18 degrees    T Axis -52 degrees    Diagnosis Line *** Poor data quality, interpretation may be adversely affected  Normal sinus rhythm  Possible Left atrial enlargement  Left ventricular hypertrophy  ST & T wave abnormality, consider inferolateral ischemia  Abnormal ECG    Confirmed by BENNIE SALINAS ([id number]) on 1/9/2022 10:38:38 AM    < end of copied text >      < from: CT Head No Cont (07.16.22 @ 15:30) > (images reviewed)    ACC: [id number] EXAM:  CT BRAIN                          PROCEDURE DATE:  07/16/2022          INTERPRETATION:  Clinical indication: Dizziness    Technique:  Multiple axial sections were acquired from the base of the skull to the   vertex without contrast enhancement. Coronal and sagittal reconstructed   images were performed as well.    Findings:  The lateral ventricles have a normal configuration.    Parenchymal volume loss is seen    Abnormal areas of low-attenuation is seen in the periventricular white   matter region. These findings could be related to chronic microvascular   ischemic changes. Please correlate clinically.    Old lacunar infarct involving the left corona radiata region is   identified.    Pineal cyst is suspected. This finding measures approximately 9.8 mm.    There is no evidence of acute hemorrhage, mass or mass-effect in the   posterior fossa or in the supratentorial region.    Evaluation of the osseous structures with the appropriate window appears   unremarkable.    The visualized paranasal sinuses mastoid and middle ear regions appear   clear.    Impression:    Periventricular white matter lucencies described above    Pineal cyst is suspected.    --- End of Report ---            ANU CHAVARRIA MD; Attending Radiologist  This document has been electronically signed. Jul 16 2022  3:35PM    < end of copied text >

## 2022-07-17 NOTE — CONSULT NOTE ADULT - SUBJECTIVE AND OBJECTIVE BOX
Cardiology  HISTORY OF PRESENT ILLNESS: HPI:  66 year old female with a past medical history of HTN and CVA 6 years ago coming to ED c/o dizziness. Patients son at bedside stating that his mother woke up yesterday and felt dizzy. She attempted to get out of bed but was unable to. She also endorses feeling nausea and had 2 episodes of non bloody non bilious vomiting. Patient has been unable to eat or drink since the onset of symptoms. Denies any headaches, dizziness, fevers, chills, chest pain, cough, SOB, abd pain, diarrhea, constipation hematuria dysuria, numbness, and weakness.    In ED VS:  T 97.8, HR 68, /81, RR 17, Spo2 100% RA  Trop 96>100  Na 128, K 3   CT head negative acute changes   Was given 1L NS in ED (16 Jul 2022 22:12)    At this time, no further dizziness or nausea. No angina. A 10 pt ROS is otherwise negative.    PAST MEDICAL & SURGICAL HISTORY:  Stroke  HTN (hypertension)  No significant past surgical history        MEDICATIONS  (STANDING):  aspirin  chewable 81 milliGRAM(s) Oral daily  atorvastatin 20 milliGRAM(s) Oral at bedtime  enoxaparin Injectable 40 milliGRAM(s) SubCutaneous every 24 hours  hydrALAZINE 50 milliGRAM(s) Oral three times a day  meclizine 25 milliGRAM(s) Oral every 12 hours  NIFEdipine XL 90 milliGRAM(s) Oral daily  pantoprazole    Tablet 40 milliGRAM(s) Oral before breakfast  sodium chloride 0.9%. 1000 milliLiter(s) (75 mL/Hr) IV Continuous <Continuous>    Allergies    No Known Allergies    Intolerances    FAMILY HISTORY:  No pertinent family history in first degree relatives      Non-contributary for premature coronary disease or sudden cardiac death    SOCIAL HISTORY:    [x ] Non-smoker  [ ] Smoker  [ ] Alcohol    PHYSICAL EXAM:  T(C): 37.1 (07-17-22 @ 11:09), Max: 37.1 (07-17-22 @ 07:18)  HR: 58 (07-17-22 @ 11:09) (44 - 68)  BP: 94/50 (07-17-22 @ 11:09) (94/50 - 181/81)  RR: 16 (07-17-22 @ 11:09) (16 - 18)  SpO2: 95% (07-17-22 @ 11:09) (95% - 100%)  Wt(kg): --    Appearance: Normal appearing adult woman in no acute distress, resting comfortably  HEENT:   Normal oral mucosa, PERRL, EOMI	  Lymphatic: No lymphadenopathy , no edema  Cardiovascular: Normal S1 S2, No JVD, No murmurs , Peripheral pulses palpable 2+ bilaterally  Respiratory: Lungs clear to auscultation, normal effort 	  Gastrointestinal:  Soft, Non-tender, + BS	  Skin: No rashes, No ecchymoses, No cyanosis, warm to touch  Musculoskeletal: Normal range of motion, normal strength  Psychiatry:  Mood & affect appropriate      TELEMETRY: NSR, flat T waves.	    ECG: Sinus bradycardia 48-50bpm 	  LABS:	 	                          11.5   6.85  )-----------( 168      ( 17 Jul 2022 06:52 )             35.8     07-17    136  |  104  |  14  ----------------------------<  90  3.9   |  24  |  0.85    Ca    8.8      17 Jul 2022 06:52  Phos  3.0     07-17  Mg     2.4     07-17    TPro  7.3  /  Alb  3.2<L>  /  TBili  0.4  /  DBili  x   /  AST  22  /  ALT  30  /  AlkPhos  59  07-17  TSH: Thyroid Stimulating Hormone, Serum: 3.18 uU/mL (07-17 @ 06:52)    Troponin I trend 96 ---> 160    ASSESSMENT/PLAN: 	66y Female with dizziness, vomiting.  Abnormal cardiac enzymes. Mild sinus bradycardia.  Hx CVA.    Working diagnosis of NSTEMI.  May have had inferior wall MI which can present as epigastric discomfort or nausea / vomiting    Will change ppx lovenox to heparin infusion for treatment.  Will order an echocardiogram.    Trend full cardiac enzymes (troponin + CPK + CKMB) overnight.  Possible xfer out for coronary angiogram tomorrow.      Bridger Montoya M.D.  Cardiac Electrophysiology    office [phone]  pager [phone]

## 2022-07-17 NOTE — CONSULT NOTE ADULT - SUBJECTIVE AND OBJECTIVE BOX
Jd Garcia MD  Nephrology    ROSANA MOODY  Patient is a 66y old  Female who presents with a chief complaint of dizziness (17 Jul 2022 13:01)    HPI:  Admitted with dizzyness, noted to have hyponatremia.  Not on Thiazides or SSRIs.  She received NS as she was hypotensive.  No Headaches or vomiting.    PAST MEDICAL & SURGICAL HISTORY:  Stroke      HTN (hypertension)      No significant past surgical history        MEDICATIONS  (STANDING):  aspirin  chewable 81 milliGRAM(s) Oral daily  atorvastatin 40 milliGRAM(s) Oral at bedtime  heparin   Injectable 4500 Unit(s) IV Push once  heparin  Infusion.  Unit(s)/Hr (11 mL/Hr) IV Continuous <Continuous>  hydrALAZINE 50 milliGRAM(s) Oral three times a day  meclizine 25 milliGRAM(s) Oral every 12 hours  NIFEdipine XL 90 milliGRAM(s) Oral daily  pantoprazole    Tablet 40 milliGRAM(s) Oral before breakfast  sodium chloride 0.9%. 1000 milliLiter(s) (75 mL/Hr) IV Continuous <Continuous>    Allergies    No Known Allergies    Intolerances      FAMILY HISTORY:  No pertinent family history in first degree relatives        REVIEW OF SYSTEMS    General:  No fever, chills or night sweats.    Ophthalmologic: No changes in vision.  	  ENMT: No difficulty swallowing. 	    Respiratory and Thorax: No cough, wheezes or dyspnea.  	  Cardiovascular: No chest pains, tiredness or palpitations.	    Gastrointestinal: No dyspepsia, constipation or diarrhea.	    Genitourinary:	 No dysuria, hematuria or frequency.    Musculoskeletal: No joint pains or swelling. No muscle pains.    Neurological:	No weakness or numbness. No seizures.    Hematology/Lymphatics: No heat or cold intolerance.    Endocrine: No polyuria or polydipsia.	      Vital Signs Last 24 Hrs  T(C): 37.1 (17 Jul 2022 11:09), Max: 37.1 (17 Jul 2022 07:18)  T(F): 98.7 (17 Jul 2022 11:09), Max: 98.7 (17 Jul 2022 07:18)  HR: 58 (17 Jul 2022 11:09) (44 - 68)  BP: 94/50 (17 Jul 2022 11:09) (94/50 - 181/81)  BP(mean): --  RR: 16 (17 Jul 2022 11:09) (16 - 18)  SpO2: 95% (17 Jul 2022 11:09) (95% - 100%)    Parameters below as of 17 Jul 2022 11:09  Patient On (Oxygen Delivery Method): room air        PHYSICAL EXAMINATION:  Constitutional:  She appears comfortable and not distressed. Not diaphoretic. Clinically euvolemic.    Neck:  The thyroid is normal. Trachea is midline.     Breasts: Normal examination.    Respiratory: The lungs are clear to auscultation. No dullness and expansion is normal.    Cardiovascular: S1 and S2 are normal. No murmurs rubs or gallops are present.    Gastrointestinal: The abdomen is soft. No tenderness is present. No masses are present. Bowel sounds are normal.    Genitourinary: The bladder is not distended. No CVA tenderness is present.    Extremities: No edema is noted. No deformities are present.    Neurological: Cognition is normal. Tone, power and sensation are normal.     Skin: No lesions are seen  or palpated.    Lymph Nodes: No lymphadenopathy is present.                          11.5   6.85  )-----------( 168      ( 17 Jul 2022 06:52 )             35.8     07-17    136  |  104  |  14  ----------------------------<  90  3.9   |  24  |  0.85    Sodium, Serum: 136 mmol/L (07.17.22 @ 06:52)   Sodium, Serum: 130 mmol/L (07.16.22 @ 22:43)   Sodium, Serum: 128 mmol/L (07.16.22 @ 15:44)     Ca    8.8      17 Jul 2022 06:52  Phos  3.0     07-17  Mg     2.4     07-17    Thyroid Stimulating Hormone, Serum: 3.18 uU/mL   Osmolality, Serum: 283 mosmol/kg (07.17.22 @ 06:52)   TPro  7.3  /  Alb  3.2<L>  /  TBili  0.4  /  DBili  x   /  AST  22  /  ALT  30  /  AlkPhos  59  07-17    LIVER FUNCTIONS - ( 17 Jul 2022 06:52 )  Alb: 3.2 g/dL / Pro: 7.3 g/dL / ALK PHOS: 59 U/L / ALT: 30 U/L DA / AST: 22 U/L / GGT: x

## 2022-07-17 NOTE — CONSULT NOTE ADULT - ASSESSMENT
Hyponatremia:  Asymptomatic Hyponatremia, clinically euvolemic at present but was Hypotensive so Hypovolemic Hyponatremia is possible. No clear risk is noted in history.  Improved with NS so this supports the diagnosis. No Hyperkalemia or Acidosis to suggest Adrenal insufficiency.  - Contine NS.  - Follow up BMP.    Jd Garcia MD  Nephrology    
Impression/ Recs  Chronic reccurent vertigo, Meclazine 25mg [id number] prn  chronic CVA, secondary stroke prevention with asa 81mg and statin  possible pineal cyst to be evelauted with outpatient MRI brain with and w/o chao and neuro/surgery eval  chest pain to be evaluated and treated as per primary team    dw Dr. Brownlee  pls call back with questions     Thank you for the courtesy of this consult.

## 2022-07-17 NOTE — PROGRESS NOTE ADULT - SUBJECTIVE AND OBJECTIVE BOX
PGY-1 Progress Note discussed with attending    PAGER #: [[phone]] TILL 5:00 PM  PLEASE CONTACT ON CALL TEAM:  - On Call Team (Please refer to Omid) FROM 5:00 PM - 8:30PM  - Nightfloat Team FROM 8:30 -7:30 AM    CC: Patient is a 81y old female who presents with a chief complaint of lightheadedness    OVERNIGHT EVENTS:    SUBJECTIVE / INTERVAL HPI: Patient seen and examined at bedside. She is comfortable and cooperative. States that since her CVA, she gets recurrent episodes of lightheadedness and vomiting, and this episode is similar to one's previous. When these episodes occur, she comes to the ED for "an injection", does not know what it is called. She currently feels well. Is able to eat, drink, urinate and produce BMs. Yesterday she was not able to eat 2/2 to nausea. She went from a laying to seated position on exam and did not feel dizzy. She is able to ambulate on her own, but has to walk slowly. Is at baseline. Denies headache, chest pain, shortness of breath, nausea, diarrhea or constipation.     ROS: negative unless otherwise stated above.    VITAL SIGNS:  Vital Signs Last 24 Hrs  T(C): 37.2 (17 Jul 2022 11:16), Max: 37.2 (17 Jul 2022 11:16)  T(F): 98.9 (17 Jul 2022 11:16), Max: 98.9 (17 Jul 2022 11:16)  HR: 75 (17 Jul 2022 11:16) (72 - 91)  BP: 103/50 (17 Jul 2022 11:16) (101/68 - 143/85)  BP(mean): 87 (16 Jul 2022 15:36) (87 - 87)  RR: 18 (17 Jul 2022 11:16) (17 - 18)  SpO2: 96% (17 Jul 2022 11:16) (96% - 99%)    Parameters below as of 17 Jul 2022 11:16  Patient On (Oxygen Delivery Method): room air        PHYSICAL EXAM:    General: WDWN  HEENT: NC/AT; PERRL, anicteric sclera; MMM  Neck: supple  Cardiovascular: +S1/S2; RRR  Respiratory: CTA B/L; no W/R/R  Gastrointestinal: soft, NT/ND; +BSx4  Extremities: WWP; no edema, clubbing or cyanosis  Vascular: 2+ radial pulses B/L  Skin: Warm, dry, good turgor, no rashes, ecchymoses, or petechiae   Neurological: AAOx3; no focal deficits    MEDICATIONS:  MEDICATIONS  (STANDING):  atorvastatin 40 milliGRAM(s) Oral at bedtime  azithromycin  IVPB 500 milliGRAM(s) IV Intermittent every 24 hours  budesonide  80 MICROgram(s)/formoterol 4.5 MICROgram(s) Inhaler 2 Puff(s) Inhalation two times a day  busPIRone 10 milliGRAM(s) Oral two times a day  carbidopa/levodopa  25/100 1.5 Tablet(s) Oral four times a day  cefTRIAXone   IVPB 1000 milliGRAM(s) IV Intermittent every 24 hours  clopidogrel Tablet 75 milliGRAM(s) Oral daily  enoxaparin Injectable 40 milliGRAM(s) SubCutaneous every 24 hours  furosemide   Injectable 40 milliGRAM(s) IV Push daily  melatonin 5 milliGRAM(s) Oral at bedtime  metoprolol succinate ER 50 milliGRAM(s) Oral daily  potassium chloride   Powder 40 milliEquivalent(s) Oral once  senna 2 Tablet(s) Oral at bedtime  sodium chloride 0.9% lock flush 3 milliLiter(s) IV Push every 8 hours  venlafaxine 25 milliGRAM(s) Oral every 12 hours    MEDICATIONS  (PRN):      ALLERGIES:  Allergies    aspirin (Unknown)    Intolerances        LABS:                        14.0   7.35  )-----------( 240      ( 17 Jul 2022 06:52 )             43.5     07-17    137  |  98  |  20<H>  ----------------------------<  103<H>  3.2<L>   |  34<H>  |  0.91    Ca    8.9      17 Jul 2022 06:52  Phos  2.9     07-17  Mg     2.3     07-17    TPro  6.6  /  Alb  3.0<L>  /  TBili  0.6  /  DBili  x   /  AST  22  /  ALT  15  /  AlkPhos  62  07-17        CAPILLARY BLOOD GLUCOSE          RADIOLOGY & ADDITIONAL TESTS:     < from: CT Head No Cont (07.16.22 @ 15:30) >  ACC: [id number] EXAM:  CT BRAIN                          PROCEDURE DATE:  07/16/2022          INTERPRETATION:  Clinical indication: Dizziness    Technique:  Multiple axial sections were acquired from the base of the skull to the   vertex without contrast enhancement. Coronal and sagittal reconstructed   images were performed as well.    Findings:  The lateral ventricles have a normal configuration.    Parenchymal volume loss is seen    Abnormal areas of low-attenuation is seen in the periventricular white   matter region. These findings could be related to chronic microvascular   ischemic changes. Please correlate clinically.    Old lacunar infarct involving the left corona radiata region is   identified.    Pineal cyst is suspected. This finding measures approximately 9.8 mm.    There is no evidence of acute hemorrhage, mass or mass-effect in the   posterior fossa or in the supratentorial region.    Evaluation of the osseous structures with the appropriate window appears   unremarkable.    The visualized paranasal sinuses mastoid and middle ear regions appear   clear.    Impression:    Periventricular white matter lucencies described above    Pineal cyst is suspected.    --- End of Report ---            ANU CHAVARRIA MD; Attending Radiologist  This document has been electronically signed. Jul 16 2022  3:35PM    < end of copied text >   PGY-1 Progress Note discussed with attending    PAGER #: [[phone]] TILL 5:00 PM  PLEASE CONTACT ON CALL TEAM:  - On Call Team (Please refer to Omid) FROM 5:00 PM - 8:30PM  - Nightfloat Team FROM 8:30 -7:30 AM    CC: Patient is a 66y old  Female who presents with a chief complaint of lightheadedness (17 Jul 2022 11:46)      OVERNIGHT EVENTS: No overnight events.    SUBJECTIVE / INTERVAL HPI: Patient seen and examined at bedside. She is comfortable and cooperative. States that since her CVA, she gets recurrent episodes of lightheadedness and vomiting, and this episode is similar to one's previous. When these episodes occur, she comes to the ED for "an injection", does not know what it is called. She currently feels well. Is able to eat, drink, urinate and produce BMs. Yesterday she was not able to eat 2/2 to nausea. She went from a laying to seated position on exam and did not feel dizzy. She is able to ambulate on her own, but has to walk slowly. Is at baseline. Denies headache, chest pain, shortness of breath, nausea, diarrhea or constipation.     ROS: negative unless otherwise stated above.    VITAL SIGNS:  Vital Signs Last 24 Hrs  T(C): 37.1 (17 Jul 2022 11:09), Max: 37.1 (17 Jul 2022 07:18)  T(F): 98.7 (17 Jul 2022 11:09), Max: 98.7 (17 Jul 2022 07:18)  HR: 58 (17 Jul 2022 11:09) (44 - 68)  BP: 94/50 (17 Jul 2022 11:09) (94/50 - 181/81)  BP(mean): --  RR: 16 (17 Jul 2022 11:09) (16 - 18)  SpO2: 95% (17 Jul 2022 11:09) (95% - 100%)    Parameters below as of 17 Jul 2022 11:09  Patient On (Oxygen Delivery Method): room air        PHYSICAL EXAM:    General: WDWN  HEENT: NC/AT; PERRL, anicteric sclera; MMM  Neck: supple  Cardiovascular: +S1/S2; RRR  Respiratory: CTA B/L; no W/R/R  Gastrointestinal: soft, NT/ND; +BSx4  Extremities: WWP; no edema, clubbing or cyanosis  Vascular: 2+ radial pulses B/L  Skin: Warm, dry, good turgor, no rashes, ecchymoses, or petechiae   Neurological: AAOx3; no focal deficits    MEDICATIONS:  MEDICATIONS  (STANDING):  aspirin  chewable 81 milliGRAM(s) Oral daily  atorvastatin 20 milliGRAM(s) Oral at bedtime  enoxaparin Injectable 40 milliGRAM(s) SubCutaneous every 24 hours  hydrALAZINE 50 milliGRAM(s) Oral three times a day  meclizine 25 milliGRAM(s) Oral every 12 hours  NIFEdipine XL 90 milliGRAM(s) Oral daily  pantoprazole    Tablet 40 milliGRAM(s) Oral before breakfast  sodium chloride 0.9%. 1000 milliLiter(s) (75 mL/Hr) IV Continuous <Continuous>    MEDICATIONS  (PRN):  ondansetron Injectable 4 milliGRAM(s) IV Push every 6 hours PRN Nausea and/or Vomiting      ALLERGIES:  Allergies    No Known Allergies    Intolerances        LABS:                        11.5   6.85  )-----------( 168      ( 17 Jul 2022 06:52 )             35.8     07-17    136  |  104  |  14  ----------------------------<  90  3.9   |  24  |  0.85    Ca    8.8      17 Jul 2022 06:52  Phos  3.0     07-17  Mg     2.4     07-17    TPro  7.3  /  Alb  3.2<L>  /  TBili  0.4  /  DBili  x   /  AST  22  /  ALT  30  /  AlkPhos  59  07-17        CAPILLARY BLOOD GLUCOSE      POCT Blood Glucose.: 149 mg/dL (16 Jul 2022 14:52)      RADIOLOGY & ADDITIONAL TESTS:     < from: CT Head No Cont (07.16.22 @ 15:30) >  ACC: [id number] EXAM:  CT BRAIN                          PROCEDURE DATE:  07/16/2022          INTERPRETATION:  Clinical indication: Dizziness    Technique:  Multiple axial sections were acquired from the base of the skull to the   vertex without contrast enhancement. Coronal and sagittal reconstructed   images were performed as well.    Findings:  The lateral ventricles have a normal configuration.    Parenchymal volume loss is seen    Abnormal areas of low-attenuation is seen in the periventricular white   matter region. These findings could be related to chronic microvascular   ischemic changes. Please correlate clinically.    Old lacunar infarct involving the left corona radiata region is   identified.    Pineal cyst is suspected. This finding measures approximately 9.8 mm.    There is no evidence of acute hemorrhage, mass or mass-effect in the   posterior fossa or in the supratentorial region.    Evaluation of the osseous structures with the appropriate window appears   unremarkable.    The visualized paranasal sinuses mastoid and middle ear regions appear   clear.    Impression:    Periventricular white matter lucencies described above    Pineal cyst is suspected.    --- End of Report ---        ANU CHAVARRIA MD; Attending Radiologist  This document has been electronically signed. Jul 16 2022  3:35PM    < end of copied text >

## 2022-07-17 NOTE — PATIENT PROFILE ADULT - FALL HARM RISK - HARM RISK INTERVENTIONS
Assistance with ambulation/Assistance OOB with selected safe patient handling equipment/Communicate Risk of Fall with Harm to all staff/Monitor gait and stability/Reinforce activity limits and safety measures with patient and family/Sit up slowly, dangle for a short time, stand at bedside before walking/Tailored Fall Risk Interventions/Visual Cue: Yellow wristband and red socks/Bed in lowest position, wheels locked, appropriate side rails in place/Call bell, personal items and telephone in reach/Instruct patient to call for assistance before getting out of bed or chair/Non-slip footwear when patient is out of bed/[place] to call system/Physically safe environment - no spills, clutter or unnecessary equipment/Purposeful Proactive Rounding/Room/bathroom lighting operational, light cord in reach

## 2022-07-18 VITALS
SYSTOLIC BLOOD PRESSURE: 151 MMHG | OXYGEN SATURATION: 99 % | RESPIRATION RATE: 16 BRPM | DIASTOLIC BLOOD PRESSURE: 72 MMHG | TEMPERATURE: 98 F | HEART RATE: 51 BPM

## 2022-07-18 LAB
ALBUMIN SERPL ELPH-MCNC: 3 G/DL — LOW (ref 3.5–5)
ALP SERPL-CCNC: 61 U/L — SIGNIFICANT CHANGE UP (ref 40–120)
ALT FLD-CCNC: 27 U/L DA — SIGNIFICANT CHANGE UP (ref 10–60)
ANION GAP SERPL CALC-SCNC: 7 MMOL/L — SIGNIFICANT CHANGE UP (ref 5–17)
APTT BLD: 163.4 SEC — CRITICAL HIGH (ref 27.5–35.5)
AST SERPL-CCNC: 19 U/L — SIGNIFICANT CHANGE UP (ref 10–40)
BILIRUB SERPL-MCNC: 0.3 MG/DL — SIGNIFICANT CHANGE UP (ref 0.2–1.2)
BUN SERPL-MCNC: 18 MG/DL — SIGNIFICANT CHANGE UP (ref 7–18)
CALCIUM SERPL-MCNC: 8.4 MG/DL — SIGNIFICANT CHANGE UP (ref 8.4–10.5)
CHLORIDE SERPL-SCNC: 111 MMOL/L — HIGH (ref 96–108)
CO2 SERPL-SCNC: 25 MMOL/L — SIGNIFICANT CHANGE UP (ref 22–31)
CREAT SERPL-MCNC: 1 MG/DL — SIGNIFICANT CHANGE UP (ref 0.5–1.3)
EGFR: 61 ML/MIN/1.73M2 — SIGNIFICANT CHANGE UP
GLUCOSE SERPL-MCNC: 99 MG/DL — SIGNIFICANT CHANGE UP (ref 70–99)
HCT VFR BLD CALC: 38.2 % — SIGNIFICANT CHANGE UP (ref 34.5–45)
HGB BLD-MCNC: 11.8 G/DL — SIGNIFICANT CHANGE UP (ref 11.5–15.5)
MAGNESIUM SERPL-MCNC: 2.3 MG/DL — SIGNIFICANT CHANGE UP (ref 1.6–2.6)
MCHC RBC-ENTMCNC: 21.5 PG — LOW (ref 27–34)
MCHC RBC-ENTMCNC: 30.9 GM/DL — LOW (ref 32–36)
MCV RBC AUTO: 69.7 FL — LOW (ref 80–100)
NRBC # BLD: 0 /100 WBCS — SIGNIFICANT CHANGE UP (ref 0–0)
PHOSPHATE SERPL-MCNC: 2.8 MG/DL — SIGNIFICANT CHANGE UP (ref 2.5–4.5)
PLATELET # BLD AUTO: 175 K/UL — SIGNIFICANT CHANGE UP (ref 150–400)
POTASSIUM SERPL-MCNC: 3.8 MMOL/L — SIGNIFICANT CHANGE UP (ref 3.5–5.3)
POTASSIUM SERPL-SCNC: 3.8 MMOL/L — SIGNIFICANT CHANGE UP (ref 3.5–5.3)
PROT SERPL-MCNC: 7 G/DL — SIGNIFICANT CHANGE UP (ref 6–8.3)
RBC # BLD: 5.48 M/UL — HIGH (ref 3.8–5.2)
RBC # FLD: 13.9 % — SIGNIFICANT CHANGE UP (ref 10.3–14.5)
SODIUM SERPL-SCNC: 143 MMOL/L — SIGNIFICANT CHANGE UP (ref 135–145)
WBC # BLD: 5.4 K/UL — SIGNIFICANT CHANGE UP (ref 3.8–10.5)
WBC # FLD AUTO: 5.4 K/UL — SIGNIFICANT CHANGE UP (ref 3.8–10.5)

## 2022-07-18 PROCEDURE — 83930 ASSAY OF BLOOD OSMOLALITY: CPT

## 2022-07-18 PROCEDURE — 93880 EXTRACRANIAL BILAT STUDY: CPT

## 2022-07-18 PROCEDURE — 84443 ASSAY THYROID STIM HORMONE: CPT

## 2022-07-18 PROCEDURE — 80053 COMPREHEN METABOLIC PANEL: CPT

## 2022-07-18 PROCEDURE — 82962 GLUCOSE BLOOD TEST: CPT

## 2022-07-18 PROCEDURE — 80061 LIPID PANEL: CPT

## 2022-07-18 PROCEDURE — 85025 COMPLETE CBC W/AUTO DIFF WBC: CPT

## 2022-07-18 PROCEDURE — 70551 MRI BRAIN STEM W/O DYE: CPT

## 2022-07-18 PROCEDURE — 70544 MR ANGIOGRAPHY HEAD W/O DYE: CPT

## 2022-07-18 PROCEDURE — 93005 ELECTROCARDIOGRAM TRACING: CPT

## 2022-07-18 PROCEDURE — 96374 THER/PROPH/DIAG INJ IV PUSH: CPT

## 2022-07-18 PROCEDURE — 83036 HEMOGLOBIN GLYCOSYLATED A1C: CPT

## 2022-07-18 PROCEDURE — 84100 ASSAY OF PHOSPHORUS: CPT

## 2022-07-18 PROCEDURE — 70551 MRI BRAIN STEM W/O DYE: CPT | Mod: 26

## 2022-07-18 PROCEDURE — 70544 MR ANGIOGRAPHY HEAD W/O DYE: CPT | Mod: 26,59

## 2022-07-18 PROCEDURE — 84484 ASSAY OF TROPONIN QUANT: CPT

## 2022-07-18 PROCEDURE — 82553 CREATINE MB FRACTION: CPT

## 2022-07-18 PROCEDURE — 93880 EXTRACRANIAL BILAT STUDY: CPT | Mod: 26

## 2022-07-18 PROCEDURE — 93306 TTE W/DOPPLER COMPLETE: CPT

## 2022-07-18 PROCEDURE — 87635 SARS-COV-2 COVID-19 AMP PRB: CPT

## 2022-07-18 PROCEDURE — 85027 COMPLETE CBC AUTOMATED: CPT

## 2022-07-18 PROCEDURE — 36415 COLL VENOUS BLD VENIPUNCTURE: CPT

## 2022-07-18 PROCEDURE — 82550 ASSAY OF CK (CPK): CPT

## 2022-07-18 PROCEDURE — 99285 EMERGENCY DEPT VISIT HI MDM: CPT

## 2022-07-18 PROCEDURE — 85730 THROMBOPLASTIN TIME PARTIAL: CPT

## 2022-07-18 PROCEDURE — 70450 CT HEAD/BRAIN W/O DYE: CPT | Mod: MA

## 2022-07-18 PROCEDURE — 83735 ASSAY OF MAGNESIUM: CPT

## 2022-07-18 PROCEDURE — 80048 BASIC METABOLIC PNL TOTAL CA: CPT

## 2022-07-18 RX ORDER — RALOXIFENE HYDROCHLORIDE 60 MG/1
1 TABLET, COATED ORAL
Qty: 0 | Refills: 0 | DISCHARGE

## 2022-07-18 RX ORDER — HYDRALAZINE HCL 50 MG
1 TABLET ORAL
Qty: 90 | Refills: 0
Start: 2022-07-18 | End: 2022-08-16

## 2022-07-18 RX ORDER — MECLIZINE HCL 12.5 MG
1 TABLET ORAL
Qty: 10 | Refills: 0
Start: 2022-07-18 | End: 2022-07-22

## 2022-07-18 RX ORDER — HYDRALAZINE/HYDROCHLOROTHIAZID 50 MG-50MG
0 CAPSULE ORAL
Qty: 0 | Refills: 0 | DISCHARGE

## 2022-07-18 RX ORDER — ICOSAPENT ETHYL 500 MG/1
2 CAPSULE, LIQUID FILLED ORAL
Qty: 0 | Refills: 0 | DISCHARGE

## 2022-07-18 RX ORDER — HEPARIN SODIUM 5000 [USP'U]/ML
5000 INJECTION INTRAVENOUS; SUBCUTANEOUS EVERY 8 HOURS
Refills: 0 | Status: DISCONTINUED | OUTPATIENT
Start: 2022-07-18 | End: 2022-07-18

## 2022-07-18 RX ADMIN — HEPARIN SODIUM 0 UNIT(S)/HR: 5000 INJECTION INTRAVENOUS; SUBCUTANEOUS at 04:45

## 2022-07-18 RX ADMIN — HEPARIN SODIUM 700 UNIT(S)/HR: 5000 INJECTION INTRAVENOUS; SUBCUTANEOUS at 05:50

## 2022-07-18 RX ADMIN — Medication 50 MILLIGRAM(S): at 08:35

## 2022-07-18 RX ADMIN — HEPARIN SODIUM 5000 UNIT(S): 5000 INJECTION INTRAVENOUS; SUBCUTANEOUS at 14:18

## 2022-07-18 RX ADMIN — Medication 50 MILLIGRAM(S): at 14:18

## 2022-07-18 RX ADMIN — Medication 81 MILLIGRAM(S): at 12:10

## 2022-07-18 NOTE — PROGRESS NOTE ADULT - PROBLEM SELECTOR PLAN 1
Recurrent episodes of lightheadedness and vomiting since CVA  - CT head negative, EKG NSR   - f/u TTE - awaiting bubble study  - c/w meclizine  - on tele  - neuro consulted Dr. Hemphill, f/u MR brain, MRA head w/o contrast, carotid duplex  - HbA1c 6.1   - lipid panel:   - c/w ASA, atorvastatin 40mg  - permissive HTN   - on NS 75cc/hr
Detail Level: Detailed
Recurrent episodes of lightheadedness and vomiting since CVA  - CT head negative, EKG NSR   - f/u TTE  - c/w meclizine  - on tele  - neuro consulted Dr. Hemphill, recs MR brain, MRA head w/o contrast, carotid duplex  - f/u HbA1c, fasting lipids  - c/w ASA, atorvastatin 40mg  - permissive HTN   - on NS 75cc/hr
Detail Level: Generalized
Detail Level: Zone
Quality 224: Stage 0-Iic Melanoma: Overutilization Of Imaging Studies For Only Stage 0-Iic Melanoma: None of the following diagnostic imaging studies ordered: chest X-ray, CT, Ultrasound, MRI, PET, or nuclear medicine scans (ML)
Detail Level: Simple
Quality 138: Melanoma: Coordination Of Care: A treatment plan was communicated to the physicians providing continuing care within one month of diagnosis outlining: diagnosis, tumor thickness and a plan for surgery or alternate care.
Quality 137: Melanoma: Continuity Of Care - Recall System: Patient information entered into a recall system that includes: target date for the next exam specified AND a process to follow up with patients regarding missed or unscheduled appointments
Quality 194: Oncology: Cancer Stage Documented: American Joint Committee on Cancer (AJCC) stage documented and reviewed

## 2022-07-18 NOTE — PROGRESS NOTE ADULT - SUBJECTIVE AND OBJECTIVE BOX
C A R D I O L O G Y  **********************************     DATE OF SERVICE: 07-18-22    Patient denies chest pain or shortness of breath.   Review of systems otherwise (-)  	  MEDICATIONS:  MEDICATIONS  (STANDING):  aspirin  chewable 81 milliGRAM(s) Oral daily  atorvastatin 40 milliGRAM(s) Oral at bedtime  heparin   Injectable 4500 Unit(s) IV Push once  heparin  Infusion.  Unit(s)/Hr (11 mL/Hr) IV Continuous <Continuous>  hydrALAZINE 50 milliGRAM(s) Oral three times a day  meclizine 25 milliGRAM(s) Oral every 12 hours  NIFEdipine XL 90 milliGRAM(s) Oral daily  pantoprazole    Tablet 40 milliGRAM(s) Oral before breakfast  sodium chloride 0.9%. 1000 milliLiter(s) (75 mL/Hr) IV Continuous <Continuous>      LABS:	 	    CARDIAC MARKERS:  CARDIAC MARKERS ( 17 Jul 2022 19:34 )  x     / x     / 118 U/L / x     / 2.3 ng/mL        Troponin I, High Sensitivity Result: 131.7 ng/L (07-17-22 @ 19:34)  Troponin I, High Sensitivity Result: 161.8 ng/L (07-17-22 @ 10:42)  Troponin I, High Sensitivity Result: 159.7 ng/L (07-17-22 @ 06:52)  Troponin I, High Sensitivity Result: 130.5 ng/L (07-16-22 @ 22:43)  Troponin I, High Sensitivity Result: 101.4 ng/L (07-16-22 @ 18:05)  Troponin I, High Sensitivity Result: 96.2 ng/L (07-16-22 @ 15:44)                              11.8   5.40  )-----------( 175      ( 18 Jul 2022 06:40 )             38.2     Hemoglobin: 11.8 g/dL (07-18 @ 06:40)  Hemoglobin: 11.7 g/dL (07-17 @ 20:14)  Hemoglobin: 11.5 g/dL (07-17 @ 06:52)  Hemoglobin: 12.5 g/dL (07-16 @ 15:44)      07-18    143  |  111<H>  |  18  ----------------------------<  99  3.8   |  25  |  1.00    Ca    8.4      18 Jul 2022 06:40  Phos  2.8     07-18  Mg     2.3     07-18    TPro  7.0  /  Alb  3.0<L>  /  TBili  0.3  /  DBili  x   /  AST  19  /  ALT  27  /  AlkPhos  61  07-18    Creatinine Trend: 1.00<--, 0.85<--, 0.79<--, 0.82<--    COAGS:   PTT - ( 18 Jul 2022 03:55 )  PTT:163.4 sec      PHYSICAL EXAM:  T(C): 36.9 (07-18-22 @ 07:22), Max: 37.1 (07-17-22 @ 11:09)  HR: 46 (07-18-22 @ 07:22) (46 - 63)  BP: 126/68 (07-18-22 @ 07:22) (94/50 - 133/64)  RR: 17 (07-18-22 @ 07:22) (16 - 17)  SpO2: 97% (07-18-22 @ 07:22) (95% - 97%)  Wt(kg): --  I&O's Summary    17 Jul 2022 07:01  -  18 Jul 2022 07:00  --------------------------------------------------------  IN: 91.5 mL / OUT: 0 mL / NET: 91.5 mL          Gen: Appears well in NAD  HEENT:  (-)icterus (-)pallor  CV: N S1 S2 1/6 CECILIA (+)2 Pulses B/l  Resp:  Clear to ausculatation B/L, normal effort  GI: (+) BS Soft, NT, ND  Lymph:  (-)Edema, (-)obvious lymphadenopathy  Skin: Warm to touch, Normal turgor  Psych: Appropriate mood and affect      TELEMETRY: 	  Sinus        ASSESSMENT/PLAN: 	66y  Female with dizziness, vomiting.  Abnormal cardiac enzymes. Mild sinus bradycardia.  Hx CVA.    - Troponin remained verv low level and trending down  - Her dizziness and episodes of vomiting appear chronic vertiginous symptoms since her CVA  - Etiology of her troponin elevation is unclear however her trensd is not consistent with ACS  - Can D/C heparin gtt since she is asymptomatix  - If echo wnl plan for nuclear stress test    Ronn Lomeli MD, [facility]  BEEPER [phone]

## 2022-07-18 NOTE — PROGRESS NOTE ADULT - ATTENDING COMMENTS
Seen and examined earlier today . No more chest pain . Awaiting MRI/MRA as well NST . Cardiology and Neurology help appreciated.
Seen and examined earlier . Cardiology and Neurology help appreciated. Presently treating for NSTEMI so started on Heparin . TTE pending.

## 2022-07-18 NOTE — PROGRESS NOTE ADULT - PROBLEM SELECTOR PLAN 3
Trop 96 > 100 > 130 > 159 > 160  - f/u trop  - EKG NSR unchanged from prior EKG  - pt not c/o chest pain  - cardio consulted Dr. Lomeli  - f/u TTE
Na 148, was Na 128 in ED s/p NS  - nephrology Dr. Rivera consulted  - monitor for over correction

## 2022-07-18 NOTE — PROGRESS NOTE ADULT - ASSESSMENT
66 year old female with a past medical history of HTN and CVA 6 years ago coming to ED c/o lightheadedness and vomiting, admitted for CVA rule out. Has these recurrent episodes since the stroke.
Hyponatremia:  Asymptomatic Hyponatremia, clinically euvolemic at present but was Hypotensive so Hypovolemic Hyponatremia is possible. No clear risk is noted in history.  Improved with NS so this supports the diagnosis. No Hyperkalemia or Acidosis to suggest Adrenal insufficiency.  - Hold IVF.  - Follow up BMP.    Jd Garcia MD  Nephrology    
66 year old female with a past medical history of HTN and CVA 6 years ago coming to ED c/o lightheadedness and vomiting, admitted for CVA rule out. Has these recurrent episodes since the stroke.

## 2022-07-18 NOTE — PROGRESS NOTE ADULT - PROBLEM SELECTOR PLAN 2
Trop 96 > 100 > 130 > 159 > 160 > 130  - EKG NSR unchanged from prior EKG  - pt not c/o chest pain  - cardio consulted Dr. Lomeli  - f/u TTE  - if TTE wnl, plan for nuclear stress test  - d/c heparin gtt Trop 96 > 100 > 130 > 159 > 160 > 130  - EKG NSR unchanged from prior EKG  - CPK/CK-MB wnl  - pt not c/o chest pain  - cardio consulted Dr. Lomeli  - f/u TTE  - if TTE wnl, plan for nuclear stress test  - d/c heparin gtt

## 2022-07-18 NOTE — PROGRESS NOTE ADULT - PROBLEM SELECTOR PLAN 5
K 3.9 s/p 20meq in ED   - monitor BMP
Hx ot HTN on Hydralazine-HCTZ and nifedipine at home  - BPs stable   - c/w home dose

## 2022-07-18 NOTE — DISCHARGE NOTE PROVIDER - HOSPITAL COURSE
66 year old female with a past medical history of HTN and CVA 6 years ago coming to ED c/o dizziness. Patients son at bedside stating that his mother woke up yesterday and felt dizzy. She attempted to get out of bed but was unable to. She also endorses feeling nausea and had 2 episodes of non bloody non bilious vomiting. Patient has been unable to eat or drink since the onset of symptoms. Denies any headaches, dizziness, fevers, chills, chest pain, cough, SOB, abd pain, diarrhea, constipation hematuria dysuria, numbness, and weakness.    In ED VS:  T 97.8, HR 68, /81, RR 17, Spo2 100% RA  Trop 96>100  Na 128, K 3   CT head negative acute changes   Was given 1L NS in ED  Pt admitted for CVA r/o. Pending echo, stress test. MR head was done. The patient has requested to leave the ED against medical advice. I believe this patient is of sound mind and competent to refuse medical care. The patient is answering and asking questions appropriately. Patient is not intoxicated and do not appear to be under the influence of any illicit drugs at this time. Patient is oriented to person, place and time. Patient is not psychotic, delusional, suicidal, homicidal or hallucinating. Patient demonstrates a normal mental capacity to make decisions regarding their healthcare. The patient has been advised of the risks of leaving AMA, which include but are not limited to death, coma, permanent disability, loss of current lifestyle.    The patient has been advised that should she change their mind; they are totally welcome to return, here, at any time. The patient understands that in no way does an AMA discharge mean that I do not want them to have the best medical care available. To this end, I have provided appropriate prescriptions, referrals, and discharge instructions.    This patient has signed the AMA form

## 2022-07-18 NOTE — DISCHARGE NOTE NURSING/CASE MANAGEMENT/SOCIAL WORK - PATIENT PORTAL LINK FT
You can access the FollowMyHealth Patient Portal offered by [facility] by registering at the following website: [email or web address]. By joining [facility]’s FollowMyHealth portal, you will also be able to view your health information using other applications (apps) compatible with our system.

## 2022-07-18 NOTE — PROGRESS NOTE ADULT - PROBLEM SELECTOR PLAN 6
Hx ot HTN on Hydralazine-HCTZ and nifedipine at home  - c/w home dose
- d/c heparin drip per cardiology recs  - resume lovenox  - PPI

## 2022-07-18 NOTE — PROGRESS NOTE ADULT - SUBJECTIVE AND OBJECTIVE BOX
ROSANA MOODY  Patient is a 66y old  Female who presents with a chief complaint of dizziness (18 Jul 2022 15:26)    HPI:  66 year old female with a past medical history of HTN and CVA 6 years ago coming to ED c/o dizziness. Patients son at bedside stating that his mother woke up yesterday and felt dizzy. She attempted to get out of bed but was unable to. She also endorses feeling nausea and had 2 episodes of non bloody non bilious vomiting. Patient has been unable to eat or drink since the onset of symptoms. Denies any headaches, dizziness, fevers, chills, chest pain, cough, SOB, abd pain, diarrhea, constipation hematuria dysuria, numbness, and weakness.    In ED VS:  T 97.8, HR 68, /81, RR 17, Spo2 100% RA  Trop 96>100  Na 128, K 3   CT head negative acute changes   Was given 1L NS in ED (16 Jul 2022 22:12)    PAST MEDICAL & SURGICAL HISTORY:  Stroke      HTN (hypertension)      No significant past surgical history        MEDICATIONS  (STANDING):  aspirin  chewable 81 milliGRAM(s) Oral daily  atorvastatin 40 milliGRAM(s) Oral at bedtime  heparin   Injectable 5000 Unit(s) SubCutaneous every 8 hours  hydrALAZINE 50 milliGRAM(s) Oral three times a day  meclizine 25 milliGRAM(s) Oral every 12 hours  NIFEdipine XL 90 milliGRAM(s) Oral daily  pantoprazole    Tablet 40 milliGRAM(s) Oral before breakfast  sodium chloride 0.9%. 1000 milliLiter(s) (75 mL/Hr) IV Continuous <Continuous>    Allergies    No Known Allergies    Intolerances      FAMILY HISTORY:  No pertinent family history in first degree relatives        REVIEW OF SYSTEMS    General:  No fever, chills or night sweats.    Ophthalmologic: No changes in vision.  	  ENMT: No difficulty swallowing. 	    Respiratory and Thorax: No cough, wheezes or dyspnea.  	  Cardiovascular: No chest pains, tiredness or palpitations.	    Gastrointestinal: No dyspepsia, constipation or diarrhea.	    Genitourinary:	 No dysuria, hematuria or frequency.    Musculoskeletal: No joint pains or swelling. No muscle pains.    Neurological:	No weakness or numbness. No seizures.    Hematology/Lymphatics: No heat or cold intolerance.    Endocrine: No polyuria or polydipsia.	      Vital Signs Last 24 Hrs  T(C): 36.9 (18 Jul 2022 11:02), Max: 37.1 (17 Jul 2022 15:58)  T(F): 98.5 (18 Jul 2022 11:02), Max: 98.7 (17 Jul 2022 15:58)  HR: 52 (18 Jul 2022 14:10) (46 - 64)  BP: 144/63 (18 Jul 2022 14:10) (110/60 - 153/70)  BP(mean): --  RR: 17 (18 Jul 2022 11:02) (16 - 17)  SpO2: 97% (18 Jul 2022 11:02) (96% - 97%)    Parameters below as of 18 Jul 2022 11:02  Patient On (Oxygen Delivery Method): room air        PHYSICAL EXAMINATION:  Constitutional:   appears comfortable and not distressed. Not diaphoretic.    Neck:  The thyroid is normal. Trachea is midline.     Breasts: Normal examination.    Respiratory: The lungs are clear to auscultation. No dullness and expansion is normal.    Cardiovascular: S1 and S2 are normal. No mummurs, rubs or gallops are present.    Gastrointestinal: The abdomen is soft. No tenderness is present. No masses are present. Bowel sounds are normal.    Genitourinary: The bladder is not distended. No CVA tenderness is present.    Extremities: No edema is noted. No deformities are present.    Neurological: Cognition is normal. Tone, power and sensation are normal. Gait is steady.    Skin: No leasions are seen  or palpated.    Lymph Nodes: No lymphadenopathy is present.    Psychiatric: Mood is appropriate. No hallucinations or flight of ideas are noted.                            11.8   5.40  )-----------( 175      ( 18 Jul 2022 06:40 )             38.2     07-18    143  |  111<H>  |  18  ----------------------------<  99  3.8   |  25  |  1.00    Ca    8.4      18 Jul 2022 06:40  Phos  2.8     07-18  Mg     2.3     07-18    TPro  7.0  /  Alb  3.0<L>  /  TBili  0.3  /  DBili  x   /  AST  19  /  ALT  27  /  AlkPhos  61  07-18    LIVER FUNCTIONS - ( 18 Jul 2022 06:40 )  Alb: 3.0 g/dL / Pro: 7.0 g/dL / ALK PHOS: 61 U/L / ALT: 27 U/L DA / AST: 19 U/L / GGT: x

## 2022-07-18 NOTE — PROGRESS NOTE ADULT - SUBJECTIVE AND OBJECTIVE BOX
PGY-1 Progress Note discussed with attending    PAGER #: [[phone]] TILL 5:00 PM  PLEASE CONTACT ON CALL TEAM:  - On Call Team (Please refer to Omid) FROM 5:00 PM - 8:30PM  - Nightfloat Team FROM 8:30 -7:30 AM    CHIEF COMPLAINT & BRIEF HOSPITAL COURSE: Juanita Ernst is a 65 yo F presenting with CC of lightheadedness and vomiting.     She has a past medical history of HTN and CVA 6 years ago coming to ED c/o dizziness. Patients son at bedside stating that his mother woke up and felt dizzy. She attempted to get out of bed but was unable to. She also endorses feeling nausea and had 2 episodes of non bloody non bilious vomiting. Symptoms have now resolved. Denies fevers, chills, chest pain, cough, SOB, abd pain, diarrhea, constipation, dysuria or numbness.    INTERVAL HPI/OVERNIGHT EVENTS: Pt is seen at bedside, she is comfortable and is in no acute distress. She is pleasant and cooperative. She had a HA last night that resolved with sleep. Other than this, she has no complaints. Denies chest pain, SOB, n/v/d. She is able to eat, drink and urinate, but has had no BMs. The dizziness has resolved, has not had any more episodes of vomiting. Pt get out of bed without any complications and was able to stand and walk without assistance. She did not complain of any dizziness or imbalance. She is eager to be d/c.      ID: [id number]      MEDICATIONS  (STANDING):  aspirin  chewable 81 milliGRAM(s) Oral daily  atorvastatin 40 milliGRAM(s) Oral at bedtime  heparin   Injectable 4500 Unit(s) IV Push once  heparin  Infusion.  Unit(s)/Hr (11 mL/Hr) IV Continuous <Continuous>  hydrALAZINE 50 milliGRAM(s) Oral three times a day  meclizine 25 milliGRAM(s) Oral every 12 hours  NIFEdipine XL 90 milliGRAM(s) Oral daily  pantoprazole    Tablet 40 milliGRAM(s) Oral before breakfast  sodium chloride 0.9%. 1000 milliLiter(s) (75 mL/Hr) IV Continuous <Continuous>    MEDICATIONS  (PRN):  heparin   Injectable 4500 Unit(s) IV Push every 6 hours PRN For aPTT less than 40  heparin   Injectable 2000 Unit(s) IV Push every 6 hours PRN For aPTT between 40 - 57  ondansetron Injectable 4 milliGRAM(s) IV Push every 6 hours PRN Nausea and/or Vomiting      REVIEW OF SYSTEMS:  CONSTITUTIONAL: No fever, weight loss, or fatigue  RESPIRATORY: No cough, no shortness of breath  CARDIOVASCULAR: No chest pain, palpitations or leg swelling  GASTROINTESTINAL: No abdominal pain. No nausea, vomiting, or hematemesis; No diarrhea.   GENITOURINARY: No dysuria or urinary frequency  NEUROLOGICAL: + headaches, weakness on L side  SKIN: No itching, burning, rashes, or lesions     Vital Signs Last 24 Hrs  T(C): 36.9 (18 Jul 2022 07:22), Max: 37.1 (17 Jul 2022 11:09)  T(F): 98.4 (18 Jul 2022 07:22), Max: 98.7 (17 Jul 2022 11:09)  HR: 46 (18 Jul 2022 07:22) (46 - 63)  BP: 126/68 (18 Jul 2022 07:22) (94/50 - 133/64)  BP(mean): --  RR: 17 (18 Jul 2022 07:22) (16 - 17)  SpO2: 97% (18 Jul 2022 07:22) (95% - 97%)    Parameters below as of 18 Jul 2022 07:22  Patient On (Oxygen Delivery Method): room air        PHYSICAL EXAMINATION:  GENERAL: NAD, WDWN  HEAD:  Atraumatic, Normocephalic  EYES:  conjunctiva and sclera clear  NECK: Supple, No JVD, Normal thyroid  CHEST/LUNG: Clear to auscultation. No rales, rhonchi, wheezing, or rubs  HEART: Regular rate and rhythm; No murmurs, rubs, or gallops  ABDOMEN: Soft, Nontender, Nondistended; Bowel sounds present  NERVOUS SYSTEM:  Alert & Oriented X3,    EXTREMITIES:  2+ Peripheral Pulses, No clubbing, cyanosis, or edema  SKIN: warm dry                          11.8   5.40  )-----------( 175      ( 18 Jul 2022 06:40 )             38.2     07-18    143  |  111<H>  |  18  ----------------------------<  99  3.8   |  25  |  1.00    Ca    8.4      18 Jul 2022 06:40  Phos  2.8     07-18  Mg     2.3     07-18    TPro  7.0  /  Alb  3.0<L>  /  TBili  0.3  /  DBili  x   /  AST  19  /  ALT  27  /  AlkPhos  61  07-18    LIVER FUNCTIONS - ( 18 Jul 2022 06:40 )  Alb: 3.0 g/dL / Pro: 7.0 g/dL / ALK PHOS: 61 U/L / ALT: 27 U/L DA / AST: 19 U/L / GGT: x           CARDIAC MARKERS ( 17 Jul 2022 19:34 )  x     / x     / 118 U/L / x     / 2.3 ng/mL      PTT - ( 18 Jul 2022 03:55 )  PTT:163.4 sec    CAPILLARY BLOOD GLUCOSE      RADIOLOGY & ADDITIONAL TESTS:      < from: CT Head No Cont (07.16.22 @ 15:30) >  ACC: [id number] EXAM:  CT BRAIN                          PROCEDURE DATE:  07/16/2022          INTERPRETATION:  Clinical indication: Dizziness    Technique:  Multiple axial sections were acquired from the base of the skull to the   vertex without contrast enhancement. Coronal and sagittal reconstructed   images were performed as well.    Findings:  The lateral ventricles have a normal configuration.    Parenchymal volume loss is seen    Abnormal areas of low-attenuation is seen in the periventricular white   matter region. These findings could be related to chronic microvascular   ischemic changes. Please correlate clinically.    Old lacunar infarct involving the left corona radiata region is   identified.    Pineal cyst is suspected. This finding measures approximately 9.8 mm.    There is no evidence of acute hemorrhage, mass or mass-effect in the   posterior fossa or in the supratentorial region.    Evaluation of the osseous structures with the appropriate window appears   unremarkable.    The visualized paranasal sinuses mastoid and middle ear regions appear   clear.    Impression:    Periventricular white matter lucencies described above    Pineal cyst is suspected.    --- End of Report ---            ANU CHAVARRIA MD; Attending Radiologist  This document has been electronically signed. Jul 16 2022  3:35PM    < end of copied text >                   PGY-1 Progress Note discussed with attending    PAGER #: [--------] TILL 5:00 PM  PLEASE CONTACT ON CALL TEAM:  - On Call Team (Please refer to Omid) FROM 5:00 PM - 8:30PM  - Nightfloat Team FROM 8:30 -7:30 AM    CHIEF COMPLAINT & BRIEF HOSPITAL COURSE: Juanita Ernst is a 65 yo F presenting with CC of lightheadedness and vomiting.     She has a past medical history of HTN and CVA 6 years ago coming to ED c/o dizziness. Patients son at bedside stating that his mother woke up and felt dizzy. She attempted to get out of bed but was unable to. She also endorses feeling nausea and had 2 episodes of non bloody non bilious vomiting. Symptoms have now resolved. Denies fevers, chills, chest pain, cough, SOB, abd pain, diarrhea, constipation, dysuria or numbness.    INTERVAL HPI/OVERNIGHT EVENTS: Pt is seen at bedside, she is comfortable and is in no acute distress. She is pleasant and cooperative. She had a HA last night that resolved with sleep. Other than this, she has no complaints. Denies chest pain, SOB, n/v/d. She is able to eat, drink and urinate, but has had no BMs. The dizziness has resolved, has not had any more episodes of vomiting. Pt get out of bed without any complications and was able to stand and walk without assistance. She did not complain of any dizziness or imbalance. She is eager to be d/c.      ID: [id number]      MEDICATIONS  (STANDING):  aspirin  chewable 81 milliGRAM(s) Oral daily  atorvastatin 40 milliGRAM(s) Oral at bedtime  heparin   Injectable 4500 Unit(s) IV Push once  heparin  Infusion.  Unit(s)/Hr (11 mL/Hr) IV Continuous <Continuous>  hydrALAZINE 50 milliGRAM(s) Oral three times a day  meclizine 25 milliGRAM(s) Oral every 12 hours  NIFEdipine XL 90 milliGRAM(s) Oral daily  pantoprazole    Tablet 40 milliGRAM(s) Oral before breakfast  sodium chloride 0.9%. 1000 milliLiter(s) (75 mL/Hr) IV Continuous <Continuous>    MEDICATIONS  (PRN):  heparin   Injectable 4500 Unit(s) IV Push every 6 hours PRN For aPTT less than 40  heparin   Injectable 2000 Unit(s) IV Push every 6 hours PRN For aPTT between 40 - 57  ondansetron Injectable 4 milliGRAM(s) IV Push every 6 hours PRN Nausea and/or Vomiting      REVIEW OF SYSTEMS:  CONSTITUTIONAL: No fever, weight loss, or fatigue  RESPIRATORY: No cough, no shortness of breath  CARDIOVASCULAR: No chest pain, palpitations or leg swelling  GASTROINTESTINAL: No abdominal pain. No nausea, vomiting, or hematemesis; No diarrhea.   GENITOURINARY: No dysuria or urinary frequency  NEUROLOGICAL: + headaches, weakness on L side  SKIN: No itching, burning, rashes, or lesions     Vital Signs Last 24 Hrs  T(C): 36.9 (18 Jul 2022 07:22), Max: 37.1 (17 Jul 2022 11:09)  T(F): 98.4 (18 Jul 2022 07:22), Max: 98.7 (17 Jul 2022 11:09)  HR: 46 (18 Jul 2022 07:22) (46 - 63)  BP: 126/68 (18 Jul 2022 07:22) (94/50 - 133/64)  BP(mean): --  RR: 17 (18 Jul 2022 07:22) (16 - 17)  SpO2: 97% (18 Jul 2022 07:22) (95% - 97%)    Parameters below as of 18 Jul 2022 07:22  Patient On (Oxygen Delivery Method): room air        PHYSICAL EXAMINATION:  GENERAL: NAD, WDWN  HEAD:  Atraumatic, Normocephalic  EYES:  conjunctiva and sclera clear  NECK: Supple, No JVD, Normal thyroid  CHEST/LUNG: Clear to auscultation. No rales, rhonchi, wheezing, or rubs  HEART: Regular rate and rhythm; No murmurs, rubs, or gallops  ABDOMEN: Soft, Nontender, Nondistended; Bowel sounds present  NERVOUS SYSTEM:  Alert & Oriented X3, muscle strength 4/5 LUE/LLE, 5/5 strength on RUE/RLE. decreased L hand   EXTREMITIES:  2+ Peripheral Pulses, No clubbing, cyanosis, or edema  SKIN: warm dry                          11.8   5.40  )-----------( 175      ( 18 Jul 2022 06:40 )             38.2     07-18    143  |  111<H>  |  18  ----------------------------<  99  3.8   |  25  |  1.00    Ca    8.4      18 Jul 2022 06:40  Phos  2.8     07-18  Mg     2.3     07-18    TPro  7.0  /  Alb  3.0<L>  /  TBili  0.3  /  DBili  x   /  AST  19  /  ALT  27  /  AlkPhos  61  07-18    LIVER FUNCTIONS - ( 18 Jul 2022 06:40 )  Alb: 3.0 g/dL / Pro: 7.0 g/dL / ALK PHOS: 61 U/L / ALT: 27 U/L DA / AST: 19 U/L / GGT: x           CARDIAC MARKERS ( 17 Jul 2022 19:34 )  x     / x     / 118 U/L / x     / 2.3 ng/mL      PTT - ( 18 Jul 2022 03:55 )  PTT:163.4 sec    CAPILLARY BLOOD GLUCOSE      RADIOLOGY & ADDITIONAL TESTS:      < from: CT Head No Cont (07.16.22 @ 15:30) >  ACC: [id number] EXAM:  CT BRAIN                          PROCEDURE DATE:  07/16/2022          INTERPRETATION:  Clinical indication: Dizziness    Technique:  Multiple axial sections were acquired from the base of the skull to the   vertex without contrast enhancement. Coronal and sagittal reconstructed   images were performed as well.    Findings:  The lateral ventricles have a normal configuration.    Parenchymal volume loss is seen    Abnormal areas of low-attenuation is seen in the periventricular white   matter region. These findings could be related to chronic microvascular   ischemic changes. Please correlate clinically.    Old lacunar infarct involving the left corona radiata region is   identified.    Pineal cyst is suspected. This finding measures approximately 9.8 mm.    There is no evidence of acute hemorrhage, mass or mass-effect in the   posterior fossa or in the supratentorial region.    Evaluation of the osseous structures with the appropriate window appears   unremarkable.    The visualized paranasal sinuses mastoid and middle ear regions appear   clear.    Impression:    Periventricular white matter lucencies described above    Pineal cyst is suspected.    --- End of Report ---            ANU CHAVARRIA MD; Attending Radiologist  This document has been electronically signed. Jul 16 2022  3:35PM    < end of copied text >                   PGY-1 Progress Note discussed with attending    PAGER #: [--------] TILL 5:00 PM  PLEASE CONTACT ON CALL TEAM:  - On Call Team (Please refer to Omid) FROM 5:00 PM - 8:30PM  - Nightfloat Team FROM 8:30 -7:30 AM    CHIEF COMPLAINT & BRIEF HOSPITAL COURSE: Juanita Ernst is a 67 yo F presenting with CC of lightheadedness and vomiting.     She has a past medical history of HTN and CVA 6 years ago coming to ED c/o dizziness. Patients son at bedside stating that his mother woke up and felt dizzy. She attempted to get out of bed but was unable to. She also endorses feeling nausea and had 2 episodes of non bloody non bilious vomiting. Symptoms have now resolved. Denies fevers, chills, chest pain, cough, SOB, abd pain, diarrhea, constipation, dysuria or numbness.    INTERVAL HPI/OVERNIGHT EVENTS: Pt is seen at bedside, she is comfortable and is in no acute distress. She is pleasant and cooperative. She had a HA last night that resolved with sleep. Other than this, she has no complaints. Denies chest pain, SOB, n/v/d. She is able to eat, drink and urinate, but has had no BMs. The dizziness has resolved, has not had any more episodes of vomiting. Pt get out of bed without any complications and was able to stand and walk without assistance. She did not complain of any dizziness or imbalance. She is eager to be d/c. Possible AMA     ID: [id number]      MEDICATIONS  (STANDING):  aspirin  chewable 81 milliGRAM(s) Oral daily  atorvastatin 40 milliGRAM(s) Oral at bedtime  heparin   Injectable 4500 Unit(s) IV Push once  heparin  Infusion.  Unit(s)/Hr (11 mL/Hr) IV Continuous <Continuous>  hydrALAZINE 50 milliGRAM(s) Oral three times a day  meclizine 25 milliGRAM(s) Oral every 12 hours  NIFEdipine XL 90 milliGRAM(s) Oral daily  pantoprazole    Tablet 40 milliGRAM(s) Oral before breakfast  sodium chloride 0.9%. 1000 milliLiter(s) (75 mL/Hr) IV Continuous <Continuous>    MEDICATIONS  (PRN):  heparin   Injectable 4500 Unit(s) IV Push every 6 hours PRN For aPTT less than 40  heparin   Injectable 2000 Unit(s) IV Push every 6 hours PRN For aPTT between 40 - 57  ondansetron Injectable 4 milliGRAM(s) IV Push every 6 hours PRN Nausea and/or Vomiting      REVIEW OF SYSTEMS:  CONSTITUTIONAL: No fever, weight loss, or fatigue  RESPIRATORY: No cough, no shortness of breath  CARDIOVASCULAR: No chest pain, palpitations or leg swelling  GASTROINTESTINAL: No abdominal pain. No nausea, vomiting, or hematemesis; No diarrhea.   GENITOURINARY: No dysuria or urinary frequency  NEUROLOGICAL: + headaches, weakness on L side  SKIN: No itching, burning, rashes, or lesions     Vital Signs Last 24 Hrs  T(C): 36.9 (18 Jul 2022 07:22), Max: 37.1 (17 Jul 2022 11:09)  T(F): 98.4 (18 Jul 2022 07:22), Max: 98.7 (17 Jul 2022 11:09)  HR: 46 (18 Jul 2022 07:22) (46 - 63)  BP: 126/68 (18 Jul 2022 07:22) (94/50 - 133/64)  BP(mean): --  RR: 17 (18 Jul 2022 07:22) (16 - 17)  SpO2: 97% (18 Jul 2022 07:22) (95% - 97%)    Parameters below as of 18 Jul 2022 07:22  Patient On (Oxygen Delivery Method): room air        PHYSICAL EXAMINATION:  GENERAL: NAD, WDWN  HEAD:  Atraumatic, Normocephalic  EYES:  conjunctiva and sclera clear  NECK: Supple, No JVD, Normal thyroid  CHEST/LUNG: Clear to auscultation. No rales, rhonchi, wheezing, or rubs  HEART: Regular rate and rhythm; No murmurs, rubs, or gallops  ABDOMEN: Soft, Nontender, Nondistended; Bowel sounds present  NERVOUS SYSTEM:  Alert & Oriented X3, muscle strength 4/5 LUE/LLE, 5/5 strength on RUE/RLE. decreased L hand   EXTREMITIES:  2+ Peripheral Pulses, No clubbing, cyanosis, or edema  SKIN: warm dry                          11.8   5.40  )-----------( 175      ( 18 Jul 2022 06:40 )             38.2     07-18    143  |  111<H>  |  18  ----------------------------<  99  3.8   |  25  |  1.00    Ca    8.4      18 Jul 2022 06:40  Phos  2.8     07-18  Mg     2.3     07-18    TPro  7.0  /  Alb  3.0<L>  /  TBili  0.3  /  DBili  x   /  AST  19  /  ALT  27  /  AlkPhos  61  07-18    LIVER FUNCTIONS - ( 18 Jul 2022 06:40 )  Alb: 3.0 g/dL / Pro: 7.0 g/dL / ALK PHOS: 61 U/L / ALT: 27 U/L DA / AST: 19 U/L / GGT: x           CARDIAC MARKERS ( 17 Jul 2022 19:34 )  x     / x     / 118 U/L / x     / 2.3 ng/mL      PTT - ( 18 Jul 2022 03:55 )  PTT:163.4 sec    CAPILLARY BLOOD GLUCOSE      RADIOLOGY & ADDITIONAL TESTS:      < from: CT Head No Cont (07.16.22 @ 15:30) >  ACC: [id number] EXAM:  CT BRAIN                          PROCEDURE DATE:  07/16/2022          INTERPRETATION:  Clinical indication: Dizziness    Technique:  Multiple axial sections were acquired from the base of the skull to the   vertex without contrast enhancement. Coronal and sagittal reconstructed   images were performed as well.    Findings:  The lateral ventricles have a normal configuration.    Parenchymal volume loss is seen    Abnormal areas of low-attenuation is seen in the periventricular white   matter region. These findings could be related to chronic microvascular   ischemic changes. Please correlate clinically.    Old lacunar infarct involving the left corona radiata region is   identified.    Pineal cyst is suspected. This finding measures approximately 9.8 mm.    There is no evidence of acute hemorrhage, mass or mass-effect in the   posterior fossa or in the supratentorial region.    Evaluation of the osseous structures with the appropriate window appears   unremarkable.    The visualized paranasal sinuses mastoid and middle ear regions appear   clear.    Impression:    Periventricular white matter lucencies described above    Pineal cyst is suspected.    --- End of Report ---            ANU CHAVARRIA MD; Attending Radiologist  This document has been electronically signed. Jul 16 2022  3:35PM    < end of copied text >

## 2022-07-18 NOTE — DISCHARGE NOTE NURSING/CASE MANAGEMENT/SOCIAL WORK - NSDCPEFALRISK_GEN_ALL_CORE
For information on Fall & Injury Prevention, visit: [email or web address] OR  [email or web address] OR  https://www.cdc.gov/steadi/patient.html

## 2022-07-18 NOTE — DISCHARGE NOTE PROVIDER - NSDCCPCAREPLAN_GEN_ALL_CORE_FT
PRINCIPAL DISCHARGE DIAGNOSIS  Diagnosis: Dizziness  Assessment and Plan of Treatment: You presented with dizziness. You were admitted to telemetry floor. You were recommended to obtain MR head to rule out stroke, An echocardiogram and a stress test. You agreed to get MR head but then wanted to leave against medical advice. You are advised to follow up with cardiologist and neurologist outpatient.      SECONDARY DISCHARGE DIAGNOSES  Diagnosis: Left against medical advice  Assessment and Plan of Treatment: You want to sign out AMA. Risk and complication related to leaving against medical advised described to you. You understand risk and yet want to leave hospital.

## 2022-10-31 ENCOUNTER — INPATIENT (INPATIENT)
Facility: HOSPITAL | Age: 67
LOS: 1 days | Discharge: HOME | DRG: 71 | End: 2022-11-02
Attending: STUDENT IN AN ORGANIZED HEALTH CARE EDUCATION/TRAINING PROGRAM | Admitting: STUDENT IN AN ORGANIZED HEALTH CARE EDUCATION/TRAINING PROGRAM
Payer: COMMERCIAL

## 2022-10-31 VITALS
OXYGEN SATURATION: 96 % | WEIGHT: 126.77 LBS | TEMPERATURE: 98 F | RESPIRATION RATE: 18 BRPM | DIASTOLIC BLOOD PRESSURE: 82 MMHG | SYSTOLIC BLOOD PRESSURE: 144 MMHG | HEART RATE: 64 BPM

## 2022-10-31 PROCEDURE — 99285 EMERGENCY DEPT VISIT HI MDM: CPT

## 2022-11-01 DIAGNOSIS — G93.40 ENCEPHALOPATHY, UNSPECIFIED: ICD-10-CM

## 2022-11-01 DIAGNOSIS — R77.8 OTHER SPECIFIED ABNORMALITIES OF PLASMA PROTEINS: ICD-10-CM

## 2022-11-01 DIAGNOSIS — E87.1 HYPO-OSMOLALITY AND HYPONATREMIA: ICD-10-CM

## 2022-11-01 DIAGNOSIS — Z29.9 ENCOUNTER FOR PROPHYLACTIC MEASURES, UNSPECIFIED: ICD-10-CM

## 2022-11-01 DIAGNOSIS — B33.8 OTHER SPECIFIED VIRAL DISEASES: ICD-10-CM

## 2022-11-01 DIAGNOSIS — I10 ESSENTIAL (PRIMARY) HYPERTENSION: ICD-10-CM

## 2022-11-01 DIAGNOSIS — Z86.73 PERSONAL HISTORY OF TRANSIENT ISCHEMIC ATTACK (TIA), AND CEREBRAL INFARCTION WITHOUT RESIDUAL DEFICITS: ICD-10-CM

## 2022-11-01 DIAGNOSIS — R11.2 NAUSEA WITH VOMITING, UNSPECIFIED: ICD-10-CM

## 2022-11-01 DIAGNOSIS — J18.9 PNEUMONIA, UNSPECIFIED ORGANISM: ICD-10-CM

## 2022-11-01 DIAGNOSIS — J12.1 RESPIRATORY SYNCYTIAL VIRUS PNEUMONIA: ICD-10-CM

## 2022-11-01 DIAGNOSIS — E86.0 DEHYDRATION: ICD-10-CM

## 2022-11-01 DIAGNOSIS — Z78.9 OTHER SPECIFIED HEALTH STATUS: Chronic | ICD-10-CM

## 2022-11-01 DIAGNOSIS — E87.6 HYPOKALEMIA: ICD-10-CM

## 2022-11-01 DIAGNOSIS — D61.818 OTHER PANCYTOPENIA: ICD-10-CM

## 2022-11-01 LAB
ALBUMIN SERPL ELPH-MCNC: 3.7 G/DL — SIGNIFICANT CHANGE UP (ref 3.5–5)
ALP SERPL-CCNC: 72 U/L — SIGNIFICANT CHANGE UP (ref 40–120)
ALT FLD-CCNC: 33 U/L DA — SIGNIFICANT CHANGE UP (ref 10–60)
ANION GAP SERPL CALC-SCNC: 10 MMOL/L — SIGNIFICANT CHANGE UP (ref 5–17)
ANION GAP SERPL CALC-SCNC: 9 MMOL/L — SIGNIFICANT CHANGE UP (ref 5–17)
APPEARANCE UR: CLEAR — SIGNIFICANT CHANGE UP
AST SERPL-CCNC: 23 U/L — SIGNIFICANT CHANGE UP (ref 10–40)
BASOPHILS # BLD AUTO: 0.01 K/UL — SIGNIFICANT CHANGE UP (ref 0–0.2)
BASOPHILS NFR BLD AUTO: 0.3 % — SIGNIFICANT CHANGE UP (ref 0–2)
BILIRUB SERPL-MCNC: 0.4 MG/DL — SIGNIFICANT CHANGE UP (ref 0.2–1.2)
BILIRUB UR-MCNC: NEGATIVE — SIGNIFICANT CHANGE UP
BUN SERPL-MCNC: 8 MG/DL — SIGNIFICANT CHANGE UP (ref 7–18)
BUN SERPL-MCNC: 9 MG/DL — SIGNIFICANT CHANGE UP (ref 7–18)
CALCIUM SERPL-MCNC: 8.6 MG/DL — SIGNIFICANT CHANGE UP (ref 8.4–10.5)
CALCIUM SERPL-MCNC: 8.7 MG/DL — SIGNIFICANT CHANGE UP (ref 8.4–10.5)
CHLORIDE SERPL-SCNC: 106 MMOL/L — SIGNIFICANT CHANGE UP (ref 96–108)
CHLORIDE SERPL-SCNC: 90 MMOL/L — LOW (ref 96–108)
CO2 SERPL-SCNC: 24 MMOL/L — SIGNIFICANT CHANGE UP (ref 22–31)
CO2 SERPL-SCNC: 26 MMOL/L — SIGNIFICANT CHANGE UP (ref 22–31)
COLOR SPEC: YELLOW — SIGNIFICANT CHANGE UP
CREAT ?TM UR-MCNC: <13 MG/DL — SIGNIFICANT CHANGE UP
CREAT SERPL-MCNC: 0.82 MG/DL — SIGNIFICANT CHANGE UP (ref 0.5–1.3)
CREAT SERPL-MCNC: 0.91 MG/DL — SIGNIFICANT CHANGE UP (ref 0.5–1.3)
DIFF PNL FLD: ABNORMAL
EGFR: 68 ML/MIN/1.73M2 — SIGNIFICANT CHANGE UP
EGFR: 77 ML/MIN/1.73M2 — SIGNIFICANT CHANGE UP
EOSINOPHIL # BLD AUTO: 0.03 K/UL — SIGNIFICANT CHANGE UP (ref 0–0.5)
EOSINOPHIL NFR BLD AUTO: 0.8 % — SIGNIFICANT CHANGE UP (ref 0–6)
GLUCOSE BLDC GLUCOMTR-MCNC: 271 MG/DL — HIGH (ref 70–99)
GLUCOSE SERPL-MCNC: 122 MG/DL — HIGH (ref 70–99)
GLUCOSE SERPL-MCNC: 140 MG/DL — HIGH (ref 70–99)
GLUCOSE UR QL: NEGATIVE — SIGNIFICANT CHANGE UP
HCT VFR BLD CALC: 37.5 % — SIGNIFICANT CHANGE UP (ref 34.5–45)
HGB BLD-MCNC: 12.1 G/DL — SIGNIFICANT CHANGE UP (ref 11.5–15.5)
IMM GRANULOCYTES NFR BLD AUTO: 0.3 % — SIGNIFICANT CHANGE UP (ref 0–0.9)
KETONES UR-MCNC: NEGATIVE — SIGNIFICANT CHANGE UP
LACTATE SERPL-SCNC: 0.6 MMOL/L — LOW (ref 0.7–2)
LEUKOCYTE ESTERASE UR-ACNC: NEGATIVE — SIGNIFICANT CHANGE UP
LIDOCAIN IGE QN: 145 U/L — SIGNIFICANT CHANGE UP (ref 73–393)
LYMPHOCYTES # BLD AUTO: 1.13 K/UL — SIGNIFICANT CHANGE UP (ref 1–3.3)
LYMPHOCYTES # BLD AUTO: 29.8 % — SIGNIFICANT CHANGE UP (ref 13–44)
MCHC RBC-ENTMCNC: 21.4 PG — LOW (ref 27–34)
MCHC RBC-ENTMCNC: 32.3 GM/DL — SIGNIFICANT CHANGE UP (ref 32–36)
MCV RBC AUTO: 66.4 FL — LOW (ref 80–100)
MONOCYTES # BLD AUTO: 0.39 K/UL — SIGNIFICANT CHANGE UP (ref 0–0.9)
MONOCYTES NFR BLD AUTO: 10.3 % — SIGNIFICANT CHANGE UP (ref 2–14)
NEUTROPHILS # BLD AUTO: 2.22 K/UL — SIGNIFICANT CHANGE UP (ref 1.8–7.4)
NEUTROPHILS NFR BLD AUTO: 58.5 % — SIGNIFICANT CHANGE UP (ref 43–77)
NITRITE UR-MCNC: NEGATIVE — SIGNIFICANT CHANGE UP
NRBC # BLD: 0 /100 WBCS — SIGNIFICANT CHANGE UP (ref 0–0)
OSMOLALITY SERPL: 282 MOSMOL/KG — SIGNIFICANT CHANGE UP (ref 280–301)
OSMOLALITY UR: 82 MOS/KG — SIGNIFICANT CHANGE UP (ref 50–1200)
PH UR: 8 — SIGNIFICANT CHANGE UP (ref 5–8)
PLATELET # BLD AUTO: 141 K/UL — LOW (ref 150–400)
POTASSIUM SERPL-MCNC: 3 MMOL/L — LOW (ref 3.5–5.3)
POTASSIUM SERPL-MCNC: 3.3 MMOL/L — LOW (ref 3.5–5.3)
POTASSIUM SERPL-SCNC: 3 MMOL/L — LOW (ref 3.5–5.3)
POTASSIUM SERPL-SCNC: 3.3 MMOL/L — LOW (ref 3.5–5.3)
PROT SERPL-MCNC: 8.1 G/DL — SIGNIFICANT CHANGE UP (ref 6–8.3)
PROT UR-MCNC: NEGATIVE — SIGNIFICANT CHANGE UP
RAPID RVP RESULT: DETECTED
RBC # BLD: 5.65 M/UL — HIGH (ref 3.8–5.2)
RBC # FLD: 13.4 % — SIGNIFICANT CHANGE UP (ref 10.3–14.5)
RSV RNA SPEC QL NAA+PROBE: DETECTED
SARS-COV-2 RNA SPEC QL NAA+PROBE: SIGNIFICANT CHANGE UP
SARS-COV-2 RNA SPEC QL NAA+PROBE: SIGNIFICANT CHANGE UP
SODIUM SERPL-SCNC: 126 MMOL/L — LOW (ref 135–145)
SODIUM SERPL-SCNC: 139 MMOL/L — SIGNIFICANT CHANGE UP (ref 135–145)
SODIUM UR-SCNC: 35 MMOL/L — SIGNIFICANT CHANGE UP
SP GR SPEC: 1.01 — SIGNIFICANT CHANGE UP (ref 1.01–1.02)
T3 SERPL-MCNC: 72 NG/DL — LOW (ref 80–200)
TROPONIN I, HIGH SENSITIVITY RESULT: 109.3 NG/L — HIGH
TROPONIN I, HIGH SENSITIVITY RESULT: 118.7 NG/L — HIGH
UROBILINOGEN FLD QL: NEGATIVE — SIGNIFICANT CHANGE UP
WBC # BLD: 3.79 K/UL — LOW (ref 3.8–10.5)
WBC # FLD AUTO: 3.79 K/UL — LOW (ref 3.8–10.5)

## 2022-11-01 PROCEDURE — 99233 SBSQ HOSP IP/OBS HIGH 50: CPT

## 2022-11-01 PROCEDURE — 71045 X-RAY EXAM CHEST 1 VIEW: CPT | Mod: 26

## 2022-11-01 PROCEDURE — 70450 CT HEAD/BRAIN W/O DYE: CPT | Mod: 26

## 2022-11-01 PROCEDURE — 99223 1ST HOSP IP/OBS HIGH 75: CPT | Mod: GC

## 2022-11-01 PROCEDURE — 71250 CT THORAX DX C-: CPT | Mod: 26

## 2022-11-01 RX ORDER — ATORVASTATIN CALCIUM 80 MG/1
20 TABLET, FILM COATED ORAL AT BEDTIME
Refills: 0 | Status: DISCONTINUED | OUTPATIENT
Start: 2022-11-01 | End: 2022-11-02

## 2022-11-01 RX ORDER — SODIUM CHLORIDE 9 MG/ML
1000 INJECTION INTRAMUSCULAR; INTRAVENOUS; SUBCUTANEOUS
Refills: 0 | Status: DISCONTINUED | OUTPATIENT
Start: 2022-11-01 | End: 2022-11-01

## 2022-11-01 RX ORDER — NIFEDIPINE 30 MG
1 TABLET, EXTENDED RELEASE 24 HR ORAL
Qty: 0 | Refills: 0 | DISCHARGE

## 2022-11-01 RX ORDER — VENLAFAXINE HCL 75 MG
37.5 CAPSULE, EXT RELEASE 24 HR ORAL DAILY
Refills: 0 | Status: DISCONTINUED | OUTPATIENT
Start: 2022-11-01 | End: 2022-11-02

## 2022-11-01 RX ORDER — VENLAFAXINE HCL 75 MG
37.5 CAPSULE, EXT RELEASE 24 HR ORAL DAILY
Refills: 0 | Status: DISCONTINUED | OUTPATIENT
Start: 2022-11-01 | End: 2022-11-01

## 2022-11-01 RX ORDER — NIFEDIPINE 30 MG
90 TABLET, EXTENDED RELEASE 24 HR ORAL DAILY
Refills: 0 | Status: DISCONTINUED | OUTPATIENT
Start: 2022-11-01 | End: 2022-11-02

## 2022-11-01 RX ORDER — ASPIRIN/CALCIUM CARB/MAGNESIUM 324 MG
1 TABLET ORAL
Qty: 0 | Refills: 0 | DISCHARGE

## 2022-11-01 RX ORDER — ALBUTEROL 90 UG/1
1 AEROSOL, METERED ORAL EVERY 6 HOURS
Refills: 0 | Status: DISCONTINUED | OUTPATIENT
Start: 2022-11-01 | End: 2022-11-02

## 2022-11-01 RX ORDER — CEFTRIAXONE 500 MG/1
INJECTION, POWDER, FOR SOLUTION INTRAMUSCULAR; INTRAVENOUS
Refills: 0 | Status: DISCONTINUED | OUTPATIENT
Start: 2022-11-01 | End: 2022-11-01

## 2022-11-01 RX ORDER — ATORVASTATIN CALCIUM 80 MG/1
1 TABLET, FILM COATED ORAL
Qty: 0 | Refills: 0 | DISCHARGE

## 2022-11-01 RX ORDER — AZITHROMYCIN 500 MG/1
TABLET, FILM COATED ORAL
Refills: 0 | Status: DISCONTINUED | OUTPATIENT
Start: 2022-11-01 | End: 2022-11-01

## 2022-11-01 RX ORDER — ASPIRIN/CALCIUM CARB/MAGNESIUM 324 MG
81 TABLET ORAL DAILY
Refills: 0 | Status: DISCONTINUED | OUTPATIENT
Start: 2022-11-01 | End: 2022-11-02

## 2022-11-01 RX ORDER — POTASSIUM CHLORIDE 20 MEQ
10 PACKET (EA) ORAL
Refills: 0 | Status: COMPLETED | OUTPATIENT
Start: 2022-11-01 | End: 2022-11-01

## 2022-11-01 RX ORDER — AZITHROMYCIN 500 MG/1
500 TABLET, FILM COATED ORAL ONCE
Refills: 0 | Status: COMPLETED | OUTPATIENT
Start: 2022-11-01 | End: 2022-11-01

## 2022-11-01 RX ORDER — SODIUM CHLORIDE 9 MG/ML
1000 INJECTION, SOLUTION INTRAVENOUS
Refills: 0 | Status: DISCONTINUED | OUTPATIENT
Start: 2022-11-01 | End: 2022-11-02

## 2022-11-01 RX ORDER — ACETAMINOPHEN 500 MG
650 TABLET ORAL EVERY 6 HOURS
Refills: 0 | Status: DISCONTINUED | OUTPATIENT
Start: 2022-11-01 | End: 2022-11-01

## 2022-11-01 RX ORDER — SODIUM CHLORIDE 9 MG/ML
1000 INJECTION INTRAMUSCULAR; INTRAVENOUS; SUBCUTANEOUS ONCE
Refills: 0 | Status: COMPLETED | OUTPATIENT
Start: 2022-11-01 | End: 2022-11-01

## 2022-11-01 RX ORDER — ASPIRIN/CALCIUM CARB/MAGNESIUM 324 MG
162 TABLET ORAL DAILY
Refills: 0 | Status: DISCONTINUED | OUTPATIENT
Start: 2022-11-01 | End: 2022-11-01

## 2022-11-01 RX ORDER — ISOSORBIDE MONONITRATE 60 MG/1
30 TABLET, EXTENDED RELEASE ORAL DAILY
Refills: 0 | Status: DISCONTINUED | OUTPATIENT
Start: 2022-11-01 | End: 2022-11-02

## 2022-11-01 RX ORDER — HYDRALAZINE HCL 50 MG
50 TABLET ORAL EVERY 8 HOURS
Refills: 0 | Status: DISCONTINUED | OUTPATIENT
Start: 2022-11-01 | End: 2022-11-02

## 2022-11-01 RX ORDER — CLOPIDOGREL BISULFATE 75 MG/1
75 TABLET, FILM COATED ORAL EVERY 24 HOURS
Refills: 0 | Status: DISCONTINUED | OUTPATIENT
Start: 2022-11-01 | End: 2022-11-02

## 2022-11-01 RX ORDER — LOSARTAN POTASSIUM 100 MG/1
100 TABLET, FILM COATED ORAL DAILY
Refills: 0 | Status: DISCONTINUED | OUTPATIENT
Start: 2022-11-01 | End: 2022-11-02

## 2022-11-01 RX ORDER — ONDANSETRON 8 MG/1
4 TABLET, FILM COATED ORAL ONCE
Refills: 0 | Status: COMPLETED | OUTPATIENT
Start: 2022-11-01 | End: 2022-11-01

## 2022-11-01 RX ORDER — HYDRALAZINE/HYDROCHLOROTHIAZID 50 MG-50MG
1 CAPSULE ORAL
Qty: 0 | Refills: 0 | DISCHARGE

## 2022-11-01 RX ORDER — CEFTRIAXONE 500 MG/1
1000 INJECTION, POWDER, FOR SOLUTION INTRAMUSCULAR; INTRAVENOUS ONCE
Refills: 0 | Status: COMPLETED | OUTPATIENT
Start: 2022-11-01 | End: 2022-11-01

## 2022-11-01 RX ADMIN — Medication 100 MILLIEQUIVALENT(S): at 03:47

## 2022-11-01 RX ADMIN — Medication 37.5 MILLIGRAM(S): at 13:26

## 2022-11-01 RX ADMIN — Medication 100 MILLIEQUIVALENT(S): at 05:22

## 2022-11-01 RX ADMIN — ONDANSETRON 4 MILLIGRAM(S): 8 TABLET, FILM COATED ORAL at 00:52

## 2022-11-01 RX ADMIN — Medication 50 MILLIGRAM(S): at 21:52

## 2022-11-01 RX ADMIN — ATORVASTATIN CALCIUM 20 MILLIGRAM(S): 80 TABLET, FILM COATED ORAL at 21:52

## 2022-11-01 RX ADMIN — SODIUM CHLORIDE 50 MILLILITER(S): 9 INJECTION, SOLUTION INTRAVENOUS at 08:13

## 2022-11-01 RX ADMIN — AZITHROMYCIN 255 MILLIGRAM(S): 500 TABLET, FILM COATED ORAL at 13:25

## 2022-11-01 RX ADMIN — Medication 90 MILLIGRAM(S): at 13:36

## 2022-11-01 RX ADMIN — LOSARTAN POTASSIUM 100 MILLIGRAM(S): 100 TABLET, FILM COATED ORAL at 13:36

## 2022-11-01 RX ADMIN — ISOSORBIDE MONONITRATE 30 MILLIGRAM(S): 60 TABLET, EXTENDED RELEASE ORAL at 13:26

## 2022-11-01 RX ADMIN — CLOPIDOGREL BISULFATE 75 MILLIGRAM(S): 75 TABLET, FILM COATED ORAL at 13:26

## 2022-11-01 RX ADMIN — SODIUM CHLORIDE 1000 MILLILITER(S): 9 INJECTION INTRAMUSCULAR; INTRAVENOUS; SUBCUTANEOUS at 00:52

## 2022-11-01 RX ADMIN — Medication 81 MILLIGRAM(S): at 13:26

## 2022-11-01 RX ADMIN — SODIUM CHLORIDE 50 MILLILITER(S): 9 INJECTION, SOLUTION INTRAVENOUS at 10:47

## 2022-11-01 RX ADMIN — SODIUM CHLORIDE 100 MILLILITER(S): 9 INJECTION INTRAMUSCULAR; INTRAVENOUS; SUBCUTANEOUS at 03:48

## 2022-11-01 RX ADMIN — Medication 100 MILLIEQUIVALENT(S): at 02:20

## 2022-11-01 RX ADMIN — Medication 50 MILLIGRAM(S): at 13:36

## 2022-11-01 RX ADMIN — CEFTRIAXONE 100 MILLIGRAM(S): 500 INJECTION, POWDER, FOR SOLUTION INTRAMUSCULAR; INTRAVENOUS at 10:50

## 2022-11-01 NOTE — H&P ADULT - NSHPPHYSICALEXAM_GEN_ALL_CORE
LOS:     VITALS:   T(C): 36.8 (11-01-22 @ 03:10), Max: 36.8 (11-01-22 @ 03:10)  HR: 60 (11-01-22 @ 03:10) (60 - 64)  BP: 146/88 (11-01-22 @ 03:10) (144/82 - 146/88)  RR: 18 (11-01-22 @ 03:10) (18 - 18)  SpO2: 96% (11-01-22 @ 03:10) (96% - 96%)    GENERAL: NAD, weak appearing  HEAD:  Atraumatic, Normocephalic  EYES: EOMI, PERRLA, conjunctiva and sclera clear  ENT: dry mucous membranes  NECK: Supple, No JVD  CHEST/LUNG: positive BS b/l;  (+) rales b/l and occasional rhonchi, no wheezing, or rubs. Unlabored respirations  HEART: Regular rate and rhythm; No murmurs, rubs, or gallops  ABDOMEN: BSx4; Soft, nontender, nondistended  EXTREMITIES:  2+ Peripheral Pulses, brisk capillary refill. No clubbing, cyanosis, or edema  NERVOUS SYSTEM:  A&Ox3, somnolent   SKIN: No rashes or lesions

## 2022-11-01 NOTE — PROGRESS NOTE ADULT - PROBLEM SELECTOR PLAN 3
initial trop elevated with EKG changes  concern for demand ischemia v/s NSTEMI  f/u repeat trop  serial EKG if trop keeps uptrending  ASA 81 mg daily  resumed home dose statin  cardiology consulted Dr. Mendiola

## 2022-11-01 NOTE — PROGRESS NOTE ADULT - SUBJECTIVE AND OBJECTIVE BOX
NP Note discussed with  Primary Attending    Patient is a 67y old  Female who presents with a chief complaint of vomiting and dizziness (2022 15:23).  Pt. seen and interviewed via [place]onese [employer]  # [id number].  Pt. reports feeling betterwith no further episodes of N/V.      INTERVAL HPI/OVERNIGHT EVENTS: no new complaints    MEDICATIONS  (STANDING):  aspirin  chewable 81 milliGRAM(s) Oral daily  atorvastatin 20 milliGRAM(s) Oral at bedtime  clopidogrel Tablet 75 milliGRAM(s) Oral every 24 hours  dextrose 5% + sodium chloride 0.9%. 1000 milliLiter(s) (50 mL/Hr) IV Continuous <Continuous>  hydrALAZINE 50 milliGRAM(s) Oral every 8 hours  isosorbide   mononitrate ER Tablet (IMDUR) 30 milliGRAM(s) Oral daily  losartan 100 milliGRAM(s) Oral daily  NIFEdipine XL 90 milliGRAM(s) Oral daily  venlafaxine XR. 37.5 milliGRAM(s) Oral daily    MEDICATIONS  (PRN):  ALBUTerol    90 MICROgram(s) HFA Inhaler 1 Puff(s) Inhalation every 6 hours PRN Bronchospasm      __________________________________________________  REVIEW OF SYSTEMS:    CONSTITUTIONAL: No fever,   EYES: no acute visual disturbances  NECK: No pain or stiffness  RESPIRATORY: No cough; No shortness of breath  CARDIOVASCULAR: No chest pain, no palpitations  GASTROINTESTINAL: No pain. No nausea or vomiting; No diarrhea   NEUROLOGICAL: No headache or numbness, no tremors  MUSCULOSKELETAL: No joint pain, no muscle pain  GENITOURINARY: no dysuria, no frequency, no hesitancy  PSYCHIATRY: no depression , no anxiety  ALL OTHER  ROS negative        Vital Signs Last 24 Hrs  T(C): 36.4 (2022 16:49), Max: 37.2 (2022 07:53)  T(F): 97.5 (2022 16:49), Max: 99 (2022 07:53)  HR: 72 (2022 16:49) (50 - 72)  BP: 137/75 (2022 16:49) (134/71 - 146/88)  BP(mean): --  RR: 18 (2022 16:49) (18 - 18)  SpO2: 99% (2022 16:49) (95% - 99%)    Parameters below as of 2022 11:35  Patient On (Oxygen Delivery Method): room air        ________________________________________________  PHYSICAL EXAM:  well developed, well groomed  GENERAL: NAD  HEENT: Normocephalic;  conjunctivae and sclerae clear; moist mucous membranes;   NECK : supple  CHEST/LUNG: Clear to auscultation bilaterally with good air entry   HEART: S1 S2  regular; no murmurs, gallops or rubs  ABDOMEN: Soft, Nontender, Nondistended; Bowel sounds present  EXTREMITIES: no cyanosis; no edema; no calf tenderness  SKIN: warm and dry; no rash  NERVOUS SYSTEM:  Awake and alert; Oriented  to place, person and time ; no new deficits    _________________________________________________  LABS:                        12.1   3.79  )-----------( 141      ( 2022 00:54 )             37.5     11    139  |  106  |  8   ----------------------------<  122<H>  3.3<L>   |  24  |  0.91    Ca    8.6      2022 10:29    TPro  8.1  /  Alb  3.7  /  TBili  0.4  /  DBili  x   /  AST  23  /  ALT  33  /  AlkPhos  72  11      Urinalysis Basic - ( 2022 03:51 )    Color: Yellow / Appearance: Clear / S.010 / pH: x  Gluc: x / Ketone: Negative  / Bili: Negative / Urobili: Negative   Blood: x / Protein: Negative / Nitrite: Negative   Leuk Esterase: Negative / RBC: Negative /HPF / WBC 0-2 /HPF   Sq Epi: x / Non Sq Epi: Few /HPF / Bacteria: Trace /HPF      CAPILLARY BLOOD GLUCOSE      POCT Blood Glucose.: 271 mg/dL (2022 08:13)    RADIOLOGY & ADDITIONAL TESTS:  < from: CT Chest No Cont (22 @ 09:39) >    ACC: [id number] EXAM:  CT CHEST                          PROCEDURE DATE:  2022          INTERPRETATION:  CLINICAL INFORMATION: Weakness, r/o pna    COMPARISON: Chest radiograph 2021    CONTRAST/COMPLICATIONS:  IV Contrast: None  Oral Contrast: None  Complications: None reported.    PROCEDURE:  CT of the Chest was performed.  Sagittal and coronal reformats were performed.    FINDINGS:    Lungs, airways and pleura: Mild dependent atelectasis in the left lower   lobe). Bilateral mosaic perfusion, likely air trapping. Mild   peribronchial thickening right lung. Sub-4 mm nodules in the right upper   lobe (3-30) and the middle lobe (3-88). Patent central airways. No   pleural effusion.    Lymph nodes, mediastinum and lower neck: No lymphadenopathy.    Heart, pericardium and vasculature: Heart size is normal. No pericardial   effusion. Mild calcifications in aorta. Coronary artery calcifications.      Bones and soft tissues: Spondylosis. Mild loss of height of the T12   vertebral body.    Other: VISUALIZED UPPER ABDOMEN: Small hepatic cyst.      IMPRESSION:  No pneumonia.    Few very small lung nodules, likely benign. If this patient is at   increased risk for lung cancer, consider follow-up in one year        --- End of Report ---    < end of copied text >    < from: CT Head No Cont (22 @ 09:39) >    ACC: [id number] EXAM:  CT BRAIN                          PROCEDURE DATE:  2022          INTERPRETATION:  CLINICAL STATEMENT: Pain.    TECHNIQUE: CT of the head was performed without IV contrast.  RAPID   artificial intelligence was utilized forthe preliminary evaluation of   intracranial hemorrhage.    COMPARISON: CT head 2022    FINDINGS:  There is mild diffuse parenchymal volume loss. There are areas of low   attenuation in the periventricular white matter likely related to mild   chronic microvascular ischemic changes.    There is no acute intracranial hemorrhage, parenchymal mass, mass effect   or midline shift. There is no acute territorial infarct. There is no   hydrocephalus. Small stable pineal cyst    The cranium is intact.    Mucosal thickening paranasal sinuses.    IMPRESSION:  No acute intracranial hemorrhage or acute territorial infarct.  If   symptoms persist, follow-up MRI exam recommended.    --- End of Report ---    < end of copied text >    < from: Xray Chest 1 View- PORTABLE-Urgent (22 @ 00:53) >    ACC: [id number] EXAM:  XR CHEST PORTABLE URGENT 1V                          PROCEDURE DATE:  2022          INTERPRETATION:  INDICATION: Rule out infiltrate    Chest one view    COMPARISON: 2021    FINDINGS:  Heart/Vascular: The heart size, mediastinum, hilum and aorta are within   normal limits for projection.  Pulmonary: Midline trachea. There is no focal infiltrate, congestion or   effusion.    Bones: There is no fracture.  Lines and catheter: None    Impression:    No acute pulmonary disease.    < end of copied text >      Respiratory Viral Panel with COVID-19 by OJ (22 @ 14:40)    Rapid RVP Result: Detected    SARS-CoV-2: NotDetec: This Respiratory Panel uses polymerase chain reaction (PCR) to detect for  adenovirus; coronavirus (HKU1, NL63, 229E, OC43); human metapneumovirus  (hMPV); human enterovirus/rhinovirus (Entero/RV); influenza A; influenza  A/H1; influenza A/H3; influenza A/H1-2009; influenza B; parainfluenza  viruses 1, 2, 3, 4; respiratory syncytial virus; Mycoplasma pneumoniae;  Chlamydophila pneumoniae; and SARS-CoV-2.    Resp Syncytial Virus (RapRVP): Detected      Imaging Personally Reviewed:  YES/NO    Consultant(s) Notes Reviewed:   YES/ No    Care Discussed with Consultants :     Plan of care was discussed with patient and /or primary care giver; all questions and concerns were addressed and care was aligned with patient's wishes.

## 2022-11-01 NOTE — ED PROVIDER NOTE - PHYSICAL EXAMINATION
No distress.  Weak appearing, + dry oral membranes.  Kernig and Brudzinski signs area negative, no petechiae, no photophobia, no dysarthria, no facial asymmetry, no focal deficits.

## 2022-11-01 NOTE — H&P ADULT - PROBLEM SELECTOR PLAN 2
started on Ceftriaxone and azithromycin   albuterol PRN for bronco-obstruction symptoms  obtain sputum cultures  f/u Strep pneumo Ag, Mycoplasma and Legionella Ag  f/u RVP started on Ceftriaxone and azithromycin   albuterol PRN for bronco-obstruction symptoms  obtain sputum cultures  f/u Strep pneumo Ag, Mycoplasma and Legionella Ag  f/u RVP  f/u CTC

## 2022-11-01 NOTE — PROGRESS NOTE ADULT - PROBLEM SELECTOR PLAN 2
on admission, associated with poor PO intake  -Kept NPO until this pm  -Pt. with no further N/V  -Regular diet started 15:00, tolerated

## 2022-11-01 NOTE — H&P ADULT - PROBLEM SELECTOR PLAN 3
initial trop elevated with EKG changes  concern for demand ischemia v/s NSTEMI  f/u repeat trop  serial EKG if trop keeps uptrending  ASA 81 mg daily  resumed home dose statin  cardiology consult initial trop elevated with EKG changes  concern for demand ischemia v/s NSTEMI  f/u repeat trop  serial EKG if trop keeps uptrending  ASA 81 mg daily  resumed home dose statin  cardiology consulted Dr. Mendiola

## 2022-11-01 NOTE — H&P ADULT - ASSESSMENT
Pt is a 66 yo F w/ pmhx of HTN and CVA coming with 1 day hx of dizziness, vomiting and decreased PO intake since Sunday as per triage note. At the time of my assessment pt was by herself and was not able to obtain collateral information from the son. In the ED VSS, PE as described above. Labs significant for pancytopenia, , K 3.0, Cl 90, lacta neg, lipase neg, UA neg, trop 117,  CXR no obvious consolidation (pending official read), EKG sinus bradycardia, w/TWI on inferio-lateral leads. In the ED she received 1L of NS and KCl. Pt is being admitted for encephalopathy, concern for PNA, and hyponatremia.  Pt is a 68 yo F w/ pmhx of HTN and CVA coming with 1 day hx of dizziness, vomiting and decreased PO intake since Sunday as per triage note. At the time of my assessment pt was by herself and was not able to obtain collateral information from the son. In the ED VSS, PE as described above. Labs significant for pancytopenia, , K 3.0, Cl 90, lacta neg, lipase neg, UA neg, trop 117,  CXR no obvious consolidation (pending official read), EKG sinus bradycardia, w/TWI on inferio-lateral leads. In the ED she received 1L of NS and KCl. Pt is being admitted for encephalopathy, concern for PNA, and hyponatremia.     resumed home dose of medications as per surescripts. Primary team to please confirm home meds

## 2022-11-01 NOTE — H&P ADULT - PROBLEM SELECTOR PLAN 6
# Anemia  Hgb 12.1 on admission  MCV 66.4, normal RBC c/w thalassemia  hold off on iron studies   monitor CBC daily    #Neutropenia  #Thrombocytopenia  monitor CBC daily

## 2022-11-01 NOTE — H&P ADULT - PROBLEM SELECTOR PLAN 4
allergic reaction hypovolemic hyposmolar hyponatremia  calculated serum osmo 263  likely 2/2 to decreased PO intake given also decreased Cl   standing gentle hydration  recheck BMP q 6 hrs to prevent overcorrection  goal Na 6-8 mEq in 24 hrs  f/u serum osm, urine osm, Urine NA

## 2022-11-01 NOTE — H&P ADULT - ATTENDING COMMENTS
Patient seen and examined at bedside.    Vital Signs Last 24 Hrs  T(C): 36.8 (01 Nov 2022 03:10), Max: 36.8 (01 Nov 2022 03:10)  T(F): 98.3 (01 Nov 2022 03:10), Max: 98.3 (01 Nov 2022 03:10)  HR: 60 (01 Nov 2022 03:10) (60 - 64)  BP: 146/88 (01 Nov 2022 03:10) (144/82 - 146/88)  BP(mean): --  RR: 18 (01 Nov 2022 03:10) (18 - 18)  SpO2: 96% (01 Nov 2022 03:10) (96% - 96%)    Parameters below as of 01 Nov 2022 03:10  Patient On (Oxygen Delivery Method): room air    Labs and imaging studies noted     Assessment and plan: Patient is a 66 yo F w, from home, Cantonese speaking PMH of HTN and CVA coming with dizziness, vomiting and decreased PO intake since Sunday as per triage note. At the time of my assessment pt was by herself, no family member with her at bedside. With the help of the  she endorsed feeling nauseous for the past 2 days,  weak, and also endorsed cough. However, aside from that  was not really able to understand the pt despite multiple attempts.    Patient seen and examined at bedside.  Vital Signs Last 24 Hrs  T(C): 36.8 (01 Nov 2022 03:10), Max: 36.8 (01 Nov 2022 03:10)  T(F): 98.3 (01 Nov 2022 03:10), Max: 98.3 (01 Nov 2022 03:10)  HR: 60 (01 Nov 2022 03:10) (60 - 64)  BP: 146/88 (01 Nov 2022 03:10) (144/82 - 146/88)  BP(mean): --  RR: 18 (01 Nov 2022 03:10) (18 - 18)  SpO2: 96% (01 Nov 2022 03:10) (96% - 96%)  Patient On (Oxygen Delivery Method): room air  AAOx3, arousable but somnolent  NAD, no resp distress, EOMI, PERRLA  chest b/l occasional ronchi  abd soft, nt, nd  no gross FND  no leg edema    Labs and imaging studies noted   no leucocytosis, Hb 12, MCV 66 microcytosis, Plt 141. sod 126,K 3.0 UA neg  Trop 118, EKG sinus bradycardia, prolonged Kgs293, ST T wave changes lateral leads    CXR no infiltrate ( follow official read)    Assessment and plan: 66 yo F w, from home, Cantonese speaking PMH of HTN and CVA coming with dizziness, vomiting and decreased PO intake admitted for hyponatremia and elevated troponin.    Acute encephalopathy   Hyponatremia - likely hypovolemic hyponatremia  Elevated troponin   Simus bradycardia  Microcytic anemia  h/o CVA  HTN    - Patient p/w decreased oral intake, n/v and generalized weakness.  - labs > hyponatremia 124, s/p iv NS in ED  - repeat BMP, send urine osm, u sod. avoid overcorrection > 6-8 meq/24 hours.  - please confirm meds in am,  patient on diuretics ?, HCTZ.  - follow CT head  - chest exam b/l ronchi, follow RVP panel, pneumonia workup, started empirically on iv ceftriaxone, Zithromax.  - also noted sinus bradycardia, prolonged Q tc, non specific ST T wave changes. elevated trop 118  - repeat cardiac enzymes, replace potassium, check magnesium level.  - patient noted microcystic anemia, send iron studies.  - Rest as above Patient is a 68 yo F w, from home, Cantonese speaking PMH of HTN and CVA coming with dizziness, vomiting and decreased PO intake since Sunday as per triage note. At the time of my assessment pt was by herself, no family member with her at bedside. With the help of the  she endorsed feeling nauseous for the past 2 days,  weak, and also endorsed cough. However, aside from that  was not really able to understand the pt despite multiple attempts.    Patient seen and examined at bedside.  Vital Signs Last 24 Hrs  T(C): 36.8 (01 Nov 2022 03:10), Max: 36.8 (01 Nov 2022 03:10)  T(F): 98.3 (01 Nov 2022 03:10), Max: 98.3 (01 Nov 2022 03:10)  HR: 60 (01 Nov 2022 03:10) (60 - 64)  BP: 146/88 (01 Nov 2022 03:10) (144/82 - 146/88)  BP(mean): --  RR: 18 (01 Nov 2022 03:10) (18 - 18)  SpO2: 96% (01 Nov 2022 03:10) (96% - 96%)  Patient On (Oxygen Delivery Method): room air  AAOx3, arousable but somnolent  NAD, no resp distress, EOMI, PERRLA  chest b/l occasional ronchi  abd soft, nt, nd  no gross FND  no leg edema    Labs and imaging studies noted   no leucocytosis, Hb 12, MCV 66 microcytosis, Plt 141. sod 126,K 3.0 UA neg  Trop 118, EKG sinus bradycardia, prolonged Yco127, ST T wave changes lateral leads    CXR no infiltrate ( follow official read)    Assessment and plan: 68 yo F w, from home, Cantonese speaking PMH of HTN and CVA coming with dizziness, vomiting and decreased PO intake admitted for hyponatremia and elevated troponin.    Acute encephalopathy   Hyponatremia - likely hypovolemic hyponatremia  Elevated troponin   Simus bradycardia  Microcytic anemia  h/o CVA  HTN    - Patient p/w decreased oral intake, n/v and generalized weakness.  - labs > hyponatremia 124, s/p iv NS in ED  - repeat BMP, send urine osm, u sod. avoid overcorrection > 6-8 meq/24 hours.  - please confirm meds in am,  patient on diuretics ?, HCTZ.  - follow CT head  - chest exam b/l ronchi, follow RVP panel, pneumonia workup, started empirically on iv ceftriaxone, Zithromax.  - also noted sinus bradycardia, prolonged Q tc, non specific ST T wave changes. elevated trop 118  - repeat cardiac enzymes, replace potassium, check magnesium level.  - telemonitoring, follow echo  - cardio consulted Dr. Mendiola.  - patient noted microcystic anemia, send iron studies.  - Rest as above

## 2022-11-01 NOTE — ED ADULT NURSE NOTE - NS ED NURSE REPORT GIVEN DT
01-Nov-2022 17:15 I personally performed the service described in the documentation recorded by the scribe in my presence, and it accurately and completely records my words and actions.

## 2022-11-01 NOTE — CONSULT NOTE ADULT - SUBJECTIVE AND OBJECTIVE BOX
PATIENT SEEN AND EXAMINED ON :- 11/1/22  DATE OF SERVICE:    11/1/22         Interim events noted,Labs ,Radiological studies and Cardiology tests reviewed .         HOSPITAL COURSE: HPI:  Pt is a 68 yo F w/ pmhx of HTN and CVA coming with dizziness, vomiting and decreased PO intake since Sunday as per triage note. At the time of my assessment pt was by herself, no family member with her at bedside. With the help of the  she endorsed feeling nauseous for the past 2 days,  weak, and also endorsed cough. However, aside from that  was not really able to understand the pt despite multiple attempts. I called pt's son to obtain collateral information, but he did not  the phone.  ROS very limited due to her MS (01 Nov 2022 06:03)      INTERIM EVENTS:Patient seen at bedside ,interim events noted.      PMH -reviewed admission note, no change since admission  HEART FAILURE: Acute[ ]Chronic[ ] Systolic[ ] Diastolic[ ] Combined Systolic and Diastolic[ ]  CAD[ ] CABG[ ] PCI[ ]  DEVICES[ ] PPM[ ] ICD[ ] ILR[ ]  ATRIAL FIBRILLATION[ ] Paroxysmal[ ] Permanent[ ] CHADS2-[  ]  MALIK[ ] CKD1[ ] CKD2[ ] CKD3[ ] CKD4[ ] ESRD[ ]  COPD[ ] HTN[ ]   DM[ ] Type1[ ] Type 2[ ]   CVA[ ] Paresis[ ]    AMBULATION: Assisted[ ] Cane/walker[ ] Independent[ ]    MEDICATIONS  (STANDING):  aspirin  chewable 81 milliGRAM(s) Oral daily  atorvastatin 20 milliGRAM(s) Oral at bedtime  clopidogrel Tablet 75 milliGRAM(s) Oral every 24 hours  dextrose 5% + sodium chloride 0.9%. 1000 milliLiter(s) (50 mL/Hr) IV Continuous <Continuous>  hydrALAZINE 50 milliGRAM(s) Oral every 8 hours  isosorbide   mononitrate ER Tablet (IMDUR) 30 milliGRAM(s) Oral daily  losartan 100 milliGRAM(s) Oral daily  NIFEdipine XL 90 milliGRAM(s) Oral daily  venlafaxine XR. 37.5 milliGRAM(s) Oral daily    MEDICATIONS  (PRN):  ALBUTerol    90 MICROgram(s) HFA Inhaler 1 Puff(s) Inhalation every 6 hours PRN Bronchospasm            REVIEW OF SYSTEMS:  Constitutional: [ ] fever, [ ]weight loss,  [ ]fatigue [ ]weight gain  Eyes: [ ] visual changes  Respiratory: [ ]shortness of breath;  [ ] cough, [ ]wheezing, [ ]chills, [ ]hemoptysis  Cardiovascular: [ ] chest pain, [ ]palpitations, [ ]dizziness,  [ ]leg swelling[ ]orthopnea[ ]PND  Gastrointestinal: [ ] abdominal pain, [ ]nausea, [ ]vomiting,  [ ]diarrhea [ ]Constipation [ ]Melena  Genitourinary: [ ] dysuria, [ ] hematuria [ ]Liz  Neurologic: [ ] headaches [ ] tremors[ ]weakness [ ]Paralysis Right[ ] Left[ ]  Skin: [ ] itching, [ ]burning, [ ] rashes  Endocrine: [ ] heat or cold intolerance  Musculoskeletal: [ ] joint pain or swelling; [ ] muscle, back, or extremity pain  Psychiatric: [ ] depression, [ ]anxiety, [ ]mood swings, or [ ]difficulty sleeping  Hematologic: [ ] easy bruising, [ ] bleeding gums    [ ] All remaining systems negative except as per above.   [ ]Unable to obtain.  [x] No change in ROS since admission      Vital Signs Last 24 Hrs  T(C): 37.2 (01 Nov 2022 11:35), Max: 37.2 (01 Nov 2022 07:53)  T(F): 98.9 (01 Nov 2022 11:35), Max: 99 (01 Nov 2022 07:53)  HR: 50 (01 Nov 2022 11:35) (50 - 64)  BP: 134/71 (01 Nov 2022 11:35) (134/71 - 146/88)  BP(mean): --  RR: 18 (01 Nov 2022 11:35) (18 - 18)  SpO2: 96% (01 Nov 2022 11:35) (95% - 96%)    Parameters below as of 01 Nov 2022 11:35  Patient On (Oxygen Delivery Method): room air      I&O's Summary      PHYSICAL EXAM:  General: No acute distress BMI-  HEENT: EOMI, PERRL  Neck: Supple, [ ] JVD  Lungs: Equal air entry bilaterally; [ ] rales [ ] wheezing [ ] rhonchi  Heart: Regular rate and rhythm; [x ] murmur   2/6 [ x] systolic [ ] diastolic [ ] radiation[ ] rubs [ ]  gallops  Abdomen: Nontender, bowel sounds present  Extremities: No clubbing, cyanosis, [ ] edema [ ]Pulses  equal and intact  Nervous system:  Alert & Oriented X3, no focal deficits  Psychiatric: Normal affect  Skin: No rashes or lesions    LABS:  11-01    139  |  106  |  8   ----------------------------<  122<H>  3.3<L>   |  24  |  0.91    Ca    8.6      01 Nov 2022 10:29    TPro  8.1  /  Alb  3.7  /  TBili  0.4  /  DBili  x   /  AST  23  /  ALT  33  /  AlkPhos  72  11-01    Creatinine Trend: 0.91<--, 0.82<--                        12.1   3.79  )-----------( 141      ( 01 Nov 2022 00:54 )             37.5

## 2022-11-01 NOTE — PROGRESS NOTE ADULT - ASSESSMENT
Pt is a 68 yo F w/ pmhx of HTN and CVA coming with 1 day hx of dizziness, vomiting and decreased PO intake since Sunday as per triage note. At the time of my assessment pt was by herself and was not able to obtain collateral information from the son. In the ED VSS, PE as described above. Labs significant for pancytopenia, , K 3.0, Cl 90, lacta neg, lipase neg, UA neg, trop 117,  CXR no obvious consolidation (pending official read), EKG sinus bradycardia, w/TWI on inferio-lateral leads. In the ED she received 1L of NS and KCl. Pt  admitted for encephalopathy, concern for PNA, and hyponatremia.   Pt. seen and interviewed via [place] [employer]  # [id number], reports feeling better with no further N/V.  Pt. noted with productive cough, CXR shows no acute pulmonary disease, CT chest negative for pneumonia.  Pt. was started on Ceftriaxone which is now d/c'd in setting of afebrile pt. with no leukocytosis and negative CXR and chest CT for pneumonia.  RVP negative for COVID, positive for RSV.    Pt. is from home, will likely return to same when course of hospitalization is completed

## 2022-11-01 NOTE — PROGRESS NOTE ADULT - NS ATTEND AMEND GEN_ALL_CORE FT
Exam was obtained with [facility]  Bhavna #[id number].  Pt states she feels better, no more vomiting. still has cough.    Vital Signs Last 24 Hrs  T(C): 37 (01 Nov 2022 17:56), Max: 37.2 (01 Nov 2022 07:53)  T(F): 98.6 (01 Nov 2022 17:56), Max: 99 (01 Nov 2022 07:53)  HR: 56 (01 Nov 2022 17:56) (50 - 72)  BP: 103/42 (01 Nov 2022 17:56) (103/42 - 146/88)  BP(mean): --  RR: 18 (01 Nov 2022 17:56) (18 - 18)  SpO2: 95% (01 Nov 2022 17:56) (95% - 99%)    Parameters below as of 01 Nov 2022 17:56  Patient On (Oxygen Delivery Method): room air          PHYSICAL EXAM:  GENERAL: NAD, well-groomed, well-developed  HEAD:  Atraumatic, Normocephalic  EYES: EOMI, conjunctiva and sclera clear  ENMT: Moist mucous membranes,   NECK: Supple, No JVD, Normal thyroid  NERVOUS SYSTEM:  Alert & Oriented, Good concentration; Moving all extremities   CHEST/LUNG: No rales, rhonchi, wheezing, or rubs  HEART: Regular rate and rhythm; No murmurs, rubs, or gallops  ABDOMEN: Soft, Nontender, Nondistended; Bowel sounds present  EXTREMITIES:  2+ Peripheral Pulses, No clubbing, cyanosis, or edema  LYMPH: No lymphadenopathy noted  SKIN: No rashes or lesions    A/P:    #RSV infection  #N/V., improving  #Hyponatremia, improved  #Acute encephalopathy, improved  #Elevated troponin, trended down  #hx of CVA    - No evidence of PNA. DC abx and c/w symptomatic care  - Started diet. monitor n/v  - c/w home meds for CVA  - DC planning tomorrow if pt is stable

## 2022-11-01 NOTE — ED PROVIDER NOTE - CARE PLAN
1 Principal Discharge DX:	Dehydration  Secondary Diagnosis:	Hypokalemia  Secondary Diagnosis:	Hyponatremia  Secondary Diagnosis:	T wave inversion in EKG

## 2022-11-01 NOTE — ED PROVIDER NOTE - CLINICAL SUMMARY MEDICAL DECISION MAKING FREE TEXT BOX
Pt is weak appearing with dry oral membranes. MAR and Dr. Anderson endorsed. Pt and daughter agrees with admission for IV hydration and electrolye correction.  EKG with new T wave inversions form July 2022. troponin ordered, pt has no chest pain.   I had a detailed discussion with the patient and/or guardian regarding the historical points, exam findings, and any diagnostic results supporting the admit diagnosis.

## 2022-11-01 NOTE — CONSULT NOTE ADULT - TIME BILLING
- Review of records, telemetry, vital signs and daily labs.   - General and cardiovascular physical examination.  - Generation of cardiovascular treatment plan.  - Coordination of care.      Patient was seen and examined by me on 11/1/22interim events noted,labs and radiology studies reviewed.  Arik Mendiola MD,FACC.  [address] [place], [place] [id number].  [phone]

## 2022-11-01 NOTE — H&P ADULT - PROBLEM SELECTOR PLAN 1
2/2 infection v/s hyponatremia  admit to medicine  fall and aspiration precautions  NPO  dysphagia screen  f/u  CT results 2/2 infection v/s hyponatremia  admit to medicine  fall and aspiration precautions  NPO  dysphagia screen  started on NS + D5 @50cc/hr X 24 hrs  FS Q 6 hrs while NPO  f/u  CTH results  f/u TFT

## 2022-11-01 NOTE — PROGRESS NOTE ADULT - PROBLEM SELECTOR PLAN 4
hypovolemic hyposmolar hyponatremia  calculated serum osmo 263  likely 2/2 to decreased PO intake given also decreased Cl   standing gentle hydration  recheck BMP q 6 hrs to prevent overcorrection  goal Na 6-8 mEq in 24 hrs  f/u serum osm, urine osm, Urine NA

## 2022-11-01 NOTE — PATIENT PROFILE ADULT - FALL HARM RISK - HARM RISK INTERVENTIONS

## 2022-11-01 NOTE — H&P ADULT - HISTORY OF PRESENT ILLNESS
Pt is a 68 yo F w/ pmhx of HTN and CVA coming with dizziness, vomiting and decreased PO intake since Sunday as per triage note. At the time of my assessment pt was by herself, no family member with her at bedside. With the help of the  she endorsed feeling nauseous for the past 2 days,  weak, and also endorsed cough. However,  was not really able to understand the pt despite multiple attempts. Therefore, ROS very limited  Pt is a 66 yo F w/ pmhx of HTN and CVA coming with dizziness, vomiting and decreased PO intake since Sunday as per triage note. At the time of my assessment pt was by herself, no family member with her at bedside. With the help of the  she endorsed feeling nauseous for the past 2 days,  weak, and also endorsed cough. However, aside from that  was not really able to understand the pt despite multiple attempts. I called pt's son to obtain collateral information, but he did not  the phone.  ROS very limited due to her MS

## 2022-11-01 NOTE — PROGRESS NOTE ADULT - PROBLEM SELECTOR PLAN 1
RVP negative for COVID, positive for RSV  -Pt. with productive cough  -Afebrile with no leukocytosis  -Supportive care for now  -CXR and chest CT with no evidence of pulmonary disease

## 2022-11-01 NOTE — ED PROVIDER NOTE - OBJECTIVE STATEMENT
67-year-old female patient request adult daughter as Cantonese .  Patient with generalized weakness associated with nausea and vomiting started this afternoon.  On evaluation patient is weak appearing with dry oral membranes.  No reported fever, no chest pain, no shortness of breath, no abdominal pain.  Patient's son and daughter denies patient having any new onset of motor weakness, facial asymmetry or difficulty talking.

## 2022-11-01 NOTE — CONSULT NOTE ADULT - ASSESSMENT
Pt is a 66 yo F w/ pmhx of HTN and CVA coming with 1 day hx of dizziness, vomiting and decreased PO intake since Sunday as per triage note. At the time of my assessment pt was by herself and was not able to obtain collateral information from the son. In the ED VSS, PE as described above. Labs significant for pancytopenia, , K 3.0, Cl 90, lacta neg, lipase neg, UA neg, trop 117,  CXR no obvious consolidation (pending official read), EKG sinus bradycardia, w/TWI on inferio-lateral leads. In the ED she received 1L of NS and KCl. Pt is being admitted for encephalopathy, concern for PNA, and hyponatremia.      Problem/Plan - 1:  ·  Problem: Encephalopathy.   ·  Plan: 2/2 infection v/s hyponatremia  admit to medicine  fall and aspiration precautions  NPO  dysphagia screen  started on NS + D5 @50cc/hr X 24 hrs  FS Q 6 hrs while NPO  f/u  CTH results  f/u TFT.     Problem/Plan - 2:  ·  Problem: PNA (pneumonia).   ·  Plan: started on Ceftriaxone and azithromycin   albuterol PRN for bronco-obstruction symptoms  obtain sputum cultures  f/u Strep pneumo Ag, Mycoplasma and Legionella Ag  f/u RVP  f/u CTC.     Problem/Plan - 3:  ·  Problem: Elevated troponin.   ·  Plan: initial trop elevated with EKG changes  concern for demand ischemia v/s NSTEMI  f/u repeat trop  serial EKG if trop keeps uptrending  ASA 81 mg daily  statin     Problem/Plan - 4:  ·  Problem: Hyponatremia.   ·  Plan: hypovolemic hyposmolar hyponatremia  calculated serum osmo 263  likely 2/2 to decreased PO intake given also decreased Cl   standing gentle hydration  recheck BMP q 6 hrs to prevent overcorrection  goal Na 6-8 mEq in 24 hrs  f/u serum osm, urine osm, Urine NA.     Problem/Plan - 5:  ·  Problem: Hypokalemia.   ·  Plan: 2/2 to decreased PO intake  started on KCl replacement in the ED   f/u repeat BMP.     Problem/Plan - 6:  ·  Problem: Pancytopenia.   ·  Plan: # Anemia  Hgb 12.1 on admission  MCV 66.4, normal RBC c/w thalassemia  hold off on iron studies   monitor CBC daily    #Neutropenia  #Thrombocytopenia  monitor CBC daily.     Problem/Plan - 7:  ·  Problem: HTN (hypertension).   ·  Plan: resumed home dose of medications as per surescripts. Primary team to please confirm home meds.     Problem/Plan - 8:  ·  Problem: H/O: CVA (cerebrovascular accident).   ·  Plan: resumed home dose of lipid lowering agent, ASA.

## 2022-11-02 VITALS
OXYGEN SATURATION: 95 % | DIASTOLIC BLOOD PRESSURE: 63 MMHG | TEMPERATURE: 99 F | SYSTOLIC BLOOD PRESSURE: 114 MMHG | RESPIRATION RATE: 18 BRPM | HEART RATE: 75 BPM

## 2022-11-02 DIAGNOSIS — R94.31 ABNORMAL ELECTROCARDIOGRAM [ECG] [EKG]: ICD-10-CM

## 2022-11-02 LAB
ANION GAP SERPL CALC-SCNC: 13 MMOL/L — SIGNIFICANT CHANGE UP (ref 5–17)
BUN SERPL-MCNC: 8 MG/DL — SIGNIFICANT CHANGE UP (ref 7–18)
CALCIUM SERPL-MCNC: 8.3 MG/DL — LOW (ref 8.4–10.5)
CHLORIDE SERPL-SCNC: 101 MMOL/L — SIGNIFICANT CHANGE UP (ref 96–108)
CO2 SERPL-SCNC: 22 MMOL/L — SIGNIFICANT CHANGE UP (ref 22–31)
CREAT SERPL-MCNC: 0.8 MG/DL — SIGNIFICANT CHANGE UP (ref 0.5–1.3)
EGFR: 80 ML/MIN/1.73M2 — SIGNIFICANT CHANGE UP
GLUCOSE BLDC GLUCOMTR-MCNC: 114 MG/DL — HIGH (ref 70–99)
GLUCOSE BLDC GLUCOMTR-MCNC: 116 MG/DL — HIGH (ref 70–99)
GLUCOSE BLDC GLUCOMTR-MCNC: 117 MG/DL — HIGH (ref 70–99)
GLUCOSE SERPL-MCNC: 119 MG/DL — HIGH (ref 70–99)
HCT VFR BLD CALC: 34 % — LOW (ref 34.5–45)
HGB BLD-MCNC: 10.9 G/DL — LOW (ref 11.5–15.5)
MCHC RBC-ENTMCNC: 21.6 PG — LOW (ref 27–34)
MCHC RBC-ENTMCNC: 32.1 GM/DL — SIGNIFICANT CHANGE UP (ref 32–36)
MCV RBC AUTO: 67.5 FL — LOW (ref 80–100)
NRBC # BLD: 0 /100 WBCS — SIGNIFICANT CHANGE UP (ref 0–0)
PLATELET # BLD AUTO: 142 K/UL — LOW (ref 150–400)
POTASSIUM SERPL-MCNC: 3 MMOL/L — LOW (ref 3.5–5.3)
POTASSIUM SERPL-SCNC: 3 MMOL/L — LOW (ref 3.5–5.3)
RBC # BLD: 5.04 M/UL — SIGNIFICANT CHANGE UP (ref 3.8–5.2)
RBC # FLD: 13.8 % — SIGNIFICANT CHANGE UP (ref 10.3–14.5)
SODIUM SERPL-SCNC: 136 MMOL/L — SIGNIFICANT CHANGE UP (ref 135–145)
T4 FREE SERPL-MCNC: 1.4 NG/DL — SIGNIFICANT CHANGE UP (ref 0.9–1.8)
TSH SERPL-MCNC: 3.52 UU/ML — SIGNIFICANT CHANGE UP (ref 0.34–4.82)
WBC # BLD: 4.43 K/UL — SIGNIFICANT CHANGE UP (ref 3.8–10.5)
WBC # FLD AUTO: 4.43 K/UL — SIGNIFICANT CHANGE UP (ref 3.8–10.5)

## 2022-11-02 PROCEDURE — 71250 CT THORAX DX C-: CPT

## 2022-11-02 PROCEDURE — 84300 ASSAY OF URINE SODIUM: CPT

## 2022-11-02 PROCEDURE — 81001 URINALYSIS AUTO W/SCOPE: CPT

## 2022-11-02 PROCEDURE — 82962 GLUCOSE BLOOD TEST: CPT

## 2022-11-02 PROCEDURE — 85027 COMPLETE CBC AUTOMATED: CPT

## 2022-11-02 PROCEDURE — 84443 ASSAY THYROID STIM HORMONE: CPT

## 2022-11-02 PROCEDURE — 70450 CT HEAD/BRAIN W/O DYE: CPT

## 2022-11-02 PROCEDURE — 86738 MYCOPLASMA ANTIBODY: CPT

## 2022-11-02 PROCEDURE — 99285 EMERGENCY DEPT VISIT HI MDM: CPT

## 2022-11-02 PROCEDURE — 87635 SARS-COV-2 COVID-19 AMP PRB: CPT

## 2022-11-02 PROCEDURE — 84484 ASSAY OF TROPONIN QUANT: CPT

## 2022-11-02 PROCEDURE — 83605 ASSAY OF LACTIC ACID: CPT

## 2022-11-02 PROCEDURE — 96375 TX/PRO/DX INJ NEW DRUG ADDON: CPT

## 2022-11-02 PROCEDURE — 83690 ASSAY OF LIPASE: CPT

## 2022-11-02 PROCEDURE — 83930 ASSAY OF BLOOD OSMOLALITY: CPT

## 2022-11-02 PROCEDURE — 96376 TX/PRO/DX INJ SAME DRUG ADON: CPT

## 2022-11-02 PROCEDURE — 36415 COLL VENOUS BLD VENIPUNCTURE: CPT

## 2022-11-02 PROCEDURE — 99239 HOSP IP/OBS DSCHRG MGMT >30: CPT | Mod: GC

## 2022-11-02 PROCEDURE — 96374 THER/PROPH/DIAG INJ IV PUSH: CPT

## 2022-11-02 PROCEDURE — 80053 COMPREHEN METABOLIC PANEL: CPT

## 2022-11-02 PROCEDURE — 84439 ASSAY OF FREE THYROXINE: CPT

## 2022-11-02 PROCEDURE — 82570 ASSAY OF URINE CREATININE: CPT

## 2022-11-02 PROCEDURE — 93005 ELECTROCARDIOGRAM TRACING: CPT

## 2022-11-02 PROCEDURE — 84480 ASSAY TRIIODOTHYRONINE (T3): CPT

## 2022-11-02 PROCEDURE — 83935 ASSAY OF URINE OSMOLALITY: CPT

## 2022-11-02 PROCEDURE — 85025 COMPLETE CBC W/AUTO DIFF WBC: CPT

## 2022-11-02 PROCEDURE — 71045 X-RAY EXAM CHEST 1 VIEW: CPT

## 2022-11-02 PROCEDURE — 80048 BASIC METABOLIC PNL TOTAL CA: CPT

## 2022-11-02 PROCEDURE — 0225U NFCT DS DNA&RNA 21 SARSCOV2: CPT

## 2022-11-02 RX ORDER — HYDRALAZINE HCL 50 MG
0 TABLET ORAL
Qty: 0 | Refills: 0 | DISCHARGE

## 2022-11-02 RX ORDER — CLOPIDOGREL BISULFATE 75 MG/1
0 TABLET, FILM COATED ORAL
Qty: 0 | Refills: 0 | DISCHARGE

## 2022-11-02 RX ORDER — NIFEDIPINE 30 MG
0 TABLET, EXTENDED RELEASE 24 HR ORAL
Qty: 0 | Refills: 0 | DISCHARGE

## 2022-11-02 RX ORDER — ATORVASTATIN CALCIUM 80 MG/1
0 TABLET, FILM COATED ORAL
Qty: 0 | Refills: 0 | DISCHARGE

## 2022-11-02 RX ORDER — VENLAFAXINE HCL 75 MG
0 CAPSULE, EXT RELEASE 24 HR ORAL
Qty: 0 | Refills: 0 | DISCHARGE

## 2022-11-02 RX ORDER — POTASSIUM CHLORIDE 20 MEQ
20 PACKET (EA) ORAL ONCE
Refills: 0 | Status: COMPLETED | OUTPATIENT
Start: 2022-11-02 | End: 2022-11-02

## 2022-11-02 RX ORDER — ASPIRIN/CALCIUM CARB/MAGNESIUM 324 MG
1 TABLET ORAL
Qty: 30 | Refills: 0
Start: 2022-11-02 | End: 2022-12-01

## 2022-11-02 RX ORDER — ISOSORBIDE MONONITRATE 60 MG/1
0 TABLET, EXTENDED RELEASE ORAL
Qty: 0 | Refills: 0 | DISCHARGE

## 2022-11-02 RX ADMIN — Medication 20 MILLIEQUIVALENT(S): at 08:04

## 2022-11-02 RX ADMIN — Medication 600 MILLIGRAM(S): at 06:53

## 2022-11-02 RX ADMIN — Medication 90 MILLIGRAM(S): at 05:58

## 2022-11-02 RX ADMIN — CLOPIDOGREL BISULFATE 75 MILLIGRAM(S): 75 TABLET, FILM COATED ORAL at 13:03

## 2022-11-02 RX ADMIN — LOSARTAN POTASSIUM 100 MILLIGRAM(S): 100 TABLET, FILM COATED ORAL at 05:59

## 2022-11-02 RX ADMIN — Medication 50 MILLIGRAM(S): at 05:58

## 2022-11-02 RX ADMIN — Medication 50 MILLIGRAM(S): at 16:02

## 2022-11-02 RX ADMIN — Medication 81 MILLIGRAM(S): at 13:03

## 2022-11-02 RX ADMIN — ISOSORBIDE MONONITRATE 30 MILLIGRAM(S): 60 TABLET, EXTENDED RELEASE ORAL at 13:03

## 2022-11-02 RX ADMIN — Medication 37.5 MILLIGRAM(S): at 16:03

## 2022-11-02 NOTE — DISCHARGE NOTE PROVIDER - CARE PROVIDER_API CALL
Ana Gill)  Internal Medicine  [address], [place] [address]  Phone: [phone]  Fax: [phone]  Follow Up Time:

## 2022-11-02 NOTE — DISCHARGE NOTE PROVIDER - HOSPITAL COURSE
Pt is a 68 yo F w/ pmhx of HTN and CVA coming with 1 day hx of dizziness, vomiting and decreased PO intake since Sunday as per triage note. At the time of my assessment pt was by herself and was not able to obtain collateral information from the son. In the ED VSS, PE as described above. Labs significant for pancytopenia, , K 3.0, Cl 90, lacta neg, lipase neg, UA neg, trop 118,  CXR was clear.  EKG sinus bradycardia, w/TWI on inferio-lateral leads. Cardiology Dr. Mendiola was consulted. Pt was started on ASA 81 daily and resumed on home dose atorvastain 20 daily. In the ED she received 1L of NS and KCl. Pt  admitted for encephalopathy, concern for PNA, and hyponatremia. Pt was empirically started on rocephin and azithromycin. Pt's symptoms of nausea and vomiting improved, pt tolerating regular diet. only complaining of a productive cough with yellow sputum. CXR shows no acute pulmonary disease, CT chest negative for pneumonia. Sodium and potassium corrected with fluids and po intake. Antibiotics were discontinued due to no leukocytosis and negative CXR and chest CT for pneumonia.  RVP negative for COVID, positive for RSV. Troponins downtrended from 118 to 109. Pt also has a history of HTN and CVA, home medications were continued during her hospital stay confirm med rec. Pt deemed medically stable for discharge.        Problem/Plan - 5:  ·  Problem: Hypokalemia.   ·  Plan: 2/2 to decreased PO intake  -K+ replaced  -f/u repeat BMP.     Problem/Plan - 6:  ·  Problem: HTN (hypertension).   ·  Plan: resumed home dose of medications as per surescripts. Primary team to please confirm home meds.     Problem/Plan - 7:  ·  Problem: H/O: CVA (cerebrovascular accident).   ·  Plan: resumed home dose of lipid lowering agent, ASA.     Problem/Plan - 8:  ·  Problem: DVT prophylaxis.   ·  Plan: SCD until result of CTH is available. Pt is a 66 yo F w/ pmhx of HTN and CVA coming with 1 day hx of dizziness, vomiting and decreased PO intake since Sunday as per triage note. At the time of my assessment pt was by herself and was not able to obtain collateral information from the son. In the ED VSS, PE as described above. Labs significant for pancytopenia, , K 3.0, Cl 90, lacta neg, lipase neg, UA neg, trop 118,  CXR was clear.  EKG sinus bradycardia, w/TWI on inferio-lateral leads. Cardiology Dr. Mendiola was consulted. Pt was started on ASA 81 daily and resumed on home dose atorvastain 20 daily. In the ED she received 1L of NS and KCl. Pt  admitted for encephalopathy, concern for PNA, and hyponatremia. Pt was empirically started on rocephin and azithromycin. Pt's symptoms of nausea and vomiting improved, pt tolerating regular diet. only complaining of a productive cough with yellow sputum. CXR shows no acute pulmonary disease, CT chest negative for pneumonia. Sodium and potassium corrected with fluids and po intake. Antibiotics were discontinued due to no leukocytosis and negative CXR and chest CT for pneumonia.  RVP negative for COVID, positive for RSV. Troponins downtrended from 118 to 109. Pt also has a history of HTN and CVA, home medications were continued during her hospital stay confirm [facility] rec. Pt deemed medically stable for discharge.

## 2022-11-02 NOTE — DISCHARGE NOTE NURSING/CASE MANAGEMENT/SOCIAL WORK - NSDCPEFALRISK_GEN_ALL_CORE
For information on Fall & Injury Prevention, visit: [email or web address] OR  [email or web address] OR  https://www.cdc.gov/steadi/patient.html

## 2022-11-02 NOTE — DISCHARGE NOTE PROVIDER - NSDCCPCAREPLAN_GEN_ALL_CORE_FT
PRINCIPAL DISCHARGE DIAGNOSIS  Diagnosis: Respiratory syncytial virus (RSV)  Assessment and Plan of Treatment: You were brought into the hospital by your family because of nausea and vomiting with confusion. You had a productive cough with yellow sputum and on exam, your lungs sounded congested. You were started on antibiotics for bacterial pneumonia coverage in the hospital. When your lab results came back positive for a respiratory synctial viral infection, your antibiotics were discontinued. You still have a cough with sputum production, but do not need any medical intervention for the viral infection. Please stay hydrated and monitor your temperature. Please follow up with your primary doctor in 1-2 weeks.      SECONDARY DISCHARGE DIAGNOSES  Diagnosis: Hyponatremia  Assessment and Plan of Treatment: You presented to the hospital with low sodium levels. This is likely due to the nausea and vomiting. You were given fluids and your sodium levels corrected. Please continue your regular diet. Please follow up with your primary care doctor in 1-2 weeks    Diagnosis: Hypokalemia  Assessment and Plan of Treatment: You presented to the hospital with low potassium levels. This is likely due to the nausea and vomiting. You were given IV fluids and your potassium levels corrected. Please continue your regular diet. Please follow up with your primary care doctor in 1-2 weeks    Diagnosis: HTN (hypertension)  Assessment and Plan of Treatment: You have a history of high blood pressure. You were continued on your home medications hydralazine, losartan and nifedipine during your hospital stay. Please continue taking your home medication on discharge and monitor your blood pressure. Please follow up with your primary care doctor in 1-2 weeks.    Diagnosis: Cerebrovascular accident (CVA)  Assessment and Plan of Treatment: You have a history of a stroke in the past. You were continued on your home medication for stroke prevention aspirin, clopidregel and atorvastatin. Please continue taking your home medications on discharge. Please follow up with your primary care doctor in 1-2 weeks.    Diagnosis: T wave inversion in EKG  Assessment and Plan of Treatment:      PRINCIPAL DISCHARGE DIAGNOSIS  Diagnosis: Respiratory syncytial virus (RSV)  Assessment and Plan of Treatment: You were brought into the hospital by your family because of nausea and vomiting with confusion. You had a productive cough with yellow sputum and on exam, your lungs sounded congested. You were started on antibiotics for bacterial pneumonia coverage in the hospital. When your lab results came back positive for a respiratory synctial viral infection, your antibiotics were discontinued. You still have a cough with sputum production, but do not need any medical intervention for the viral infection. Please stay hydrated and monitor your temperature. Please follow up with your primary doctor in 1-2 weeks.      SECONDARY DISCHARGE DIAGNOSES  Diagnosis: Elevated troponin  Assessment and Plan of Treatment: You had elevated troponin likely demand ischemia in the setting of infection which trended down later. Please follow up with your primary doctor in 1-2 weeks.    Diagnosis: Hypokalemia  Assessment and Plan of Treatment: You presented to the hospital with low potassium levels. This is likely due to the nausea and vomiting. You were given IV fluids and your potassium levels corrected. Please continue your regular diet. Please follow up with your primary care doctor in 1-2 weeks    Diagnosis: Hyponatremia  Assessment and Plan of Treatment: You presented to the hospital with low sodium levels. This is likely due to the nausea and vomiting. You were given fluids and your sodium levels corrected. Please continue your regular diet. Please follow up with your primary care doctor in 1-2 weeks    Diagnosis: HTN (hypertension)  Assessment and Plan of Treatment: You have a history of high blood pressure. You were continued on your home medications hydralazine, losartan and nifedipine during your hospital stay. Please continue taking your home medication on discharge and monitor your blood pressure. Please follow up with your primary care doctor in 1-2 weeks.    Diagnosis: Cerebrovascular accident (CVA)  Assessment and Plan of Treatment: You have a history of a stroke in the past. You were continued on your home medication for stroke prevention aspirin, clopidregel and atorvastatin. Please continue taking your home medications on discharge. Please follow up with your primary care doctor in 1-2 weeks.     PRINCIPAL DISCHARGE DIAGNOSIS  Diagnosis: Respiratory syncytial virus (RSV)  Assessment and Plan of Treatment: You were brought into the hospital by your family because of nausea and vomiting with confusion. You had a productive cough with yellow sputum and on exam, your lungs sounded congested. You were started on antibiotics for bacterial pneumonia coverage in the hospital. When your lab results came back positive for a respiratory synctial viral infection. Xray and CT chest didn't show an evidence of pneumonia and  your antibiotics were discontinued. You still have a cough with sputum production, but do not need any medical intervention for the viral infection. Please stay hydrated and monitor your temperature. Please follow up with your primary doctor in 1-2 weeks.      SECONDARY DISCHARGE DIAGNOSES  Diagnosis: Hypokalemia  Assessment and Plan of Treatment: You presented to the hospital with low potassium levels. This is likely due to the nausea and vomiting. You were given IV fluids and your potassium levels corrected. Please continue your regular diet. Please follow up with your primary care doctor in 1-2 weeks    Diagnosis: Hyponatremia  Assessment and Plan of Treatment: You presented to the hospital with low sodium levels. This is likely due to the nausea and vomiting. You were given fluids and your sodium levels corrected. Please continue your regular diet. Please follow up with your primary care doctor in 1-2 weeks    Diagnosis: HTN (hypertension)  Assessment and Plan of Treatment: You have a history of high blood pressure. You were continued on your home medications hydralazine, losartan and nifedipine during your hospital stay. Please continue taking your home medication on discharge and monitor your blood pressure. Please follow up with your primary care doctor in 1-2 weeks.    Diagnosis: Cerebrovascular accident (CVA)  Assessment and Plan of Treatment: You have a history of a stroke in the past. You were continued on your home medication for stroke prevention aspirin, clopidregel and atorvastatin. Please continue taking your home medications on discharge. Please follow up with your primary care doctor in 1-2 weeks.    Diagnosis: Elevated troponin  Assessment and Plan of Treatment: You had elevated troponin likely demand ischemia in the setting of infection which trended down later. Please follow up with your primary doctor in 1-2 weeks.

## 2022-11-02 NOTE — DISCHARGE NOTE NURSING/CASE MANAGEMENT/SOCIAL WORK - PATIENT PORTAL LINK FT
You can access the FollowMyHealth Patient Portal offered by [facility] by registering at the following website: [email or web address]. By joining [facility]’s FollowMyHealth portal, you will also be able to view your health information using other applications (apps) compatible with our system.

## 2022-11-02 NOTE — DISCHARGE NOTE PROVIDER - ATTENDING DISCHARGE PHYSICAL EXAMINATION:
Vital Signs Last 24 Hrs  T(C): 37.2 (02 Nov 2022 14:19), Max: 37.2 (02 Nov 2022 14:19)  T(F): 98.9 (02 Nov 2022 14:19), Max: 98.9 (02 Nov 2022 14:19)  HR: 75 (02 Nov 2022 14:19) (52 - 75)  BP: 114/63 (02 Nov 2022 14:19) (110/59 - 123/53)  BP(mean): --  RR: 18 (02 Nov 2022 14:19) (18 - 18)  SpO2: 95% (02 Nov 2022 14:19) (93% - 96%)    Parameters below as of 02 Nov 2022 14:19  Patient On (Oxygen Delivery Method): room air    PHYSICAL EXAM:  GENERAL: NAD, well-groomed, well-developed  HEAD:  Atraumatic, Normocephalic  EYES: EOMI, conjunctiva and sclera clear  ENMT: No tonsillar erythema, exudates, or enlargement; Moist mucous membranes  NECK: Supple, No JVD  NERVOUS SYSTEM:  Alert & alert, Good concentration; Moving all extremities, walks independently  CHEST/LUNG:  No rales, rhonchi, wheezing, or rubs  HEART: Regular rate and rhythm; No murmurs, rubs, or gallops  ABDOMEN: Soft, Nontender, Nondistended; Bowel sounds present  EXTREMITIES:  2+ Peripheral Pulses, No clubbing, cyanosis, or edema  SKIN: No rashes or lesions

## 2022-11-02 NOTE — DISCHARGE NOTE PROVIDER - NSDCMRMEDTOKEN_GEN_ALL_CORE_FT
ATORVASTATIN 20MG TAB:   CLOPIDOGREL 75MG TAB:   GABAPENTIN 100MG CAP:   HYDRALAZINE 50MG TAB:   ISOSORB MONO 30MG ER TAB:   MECLIZIN RX 25MG TAB:   NIFEDIPIN CC 90MG ER TAB:   OLMESARTAN 40MG TAB:   RALOXIFENE 60MG TAB:   VENLAFAXINE 37.5MG TAB:    Aspirin Low Dose 81 mg oral tablet, chewable: 1 tab(s) orally once a day  ATORVASTATIN 20MG TAB: tab(s) orally once a day  CLOPIDOGREL 75MG TAB: tab(s) orally once a day  GABAPENTIN 100MG CAP:   guaiFENesin 600 mg oral tablet, extended release: 1 tab(s) orally every 12 hours, As needed, Cough  HYDRALAZINE 50MG TAB: tab(s) orally 3 times a day  ISOSORB MONO 30MG ER TAB: tab(s) orally once a day  MECLIZIN RX 25MG TAB:   NIFEDIPIN CC 90MG ER TAB: tab(s) orally 2 times a day  OLMESARTAN 40MG TAB:   RALOXIFENE 60MG TAB:   VENLAFAXINE 37.5MG TAB: tab(s) orally once a day (at bedtime)   ATORVASTATIN 20MG TAB: tab(s) orally once a day  CLOPIDOGREL 75MG TAB: tab(s) orally once a day  GABAPENTIN 100MG CAP:   guaiFENesin 600 mg oral tablet, extended release: 1 tab(s) orally every 12 hours, As needed, Cough  HYDRALAZINE 50MG TAB: tab(s) orally 3 times a day  ISOSORB MONO 30MG ER TAB: tab(s) orally once a day  MECLIZIN RX 25MG TAB:   NIFEDIPIN CC 90MG ER TAB: tab(s) orally 2 times a day  OLMESARTAN 40MG TAB:   RALOXIFENE 60MG TAB:   VENLAFAXINE 37.5MG TAB: tab(s) orally once a day (at bedtime)

## 2022-11-04 LAB
M PNEUMO IGM SER-ACNC: 1.29 INDEX — HIGH (ref 0–0.9)
MYCOPLASMA AG SPEC QL: POSITIVE

## 2023-06-30 ENCOUNTER — INPATIENT (INPATIENT)
Facility: HOSPITAL | Age: 68
LOS: 1 days | Discharge: HOME | DRG: 178 | End: 2023-07-02
Attending: STUDENT IN AN ORGANIZED HEALTH CARE EDUCATION/TRAINING PROGRAM | Admitting: STUDENT IN AN ORGANIZED HEALTH CARE EDUCATION/TRAINING PROGRAM
Payer: MEDICARE

## 2023-06-30 VITALS
OXYGEN SATURATION: 98 % | SYSTOLIC BLOOD PRESSURE: 180 MMHG | RESPIRATION RATE: 20 BRPM | HEART RATE: 59 BPM | WEIGHT: 120.37 LBS | DIASTOLIC BLOOD PRESSURE: 89 MMHG | TEMPERATURE: 98 F

## 2023-06-30 DIAGNOSIS — Z78.9 OTHER SPECIFIED HEALTH STATUS: Chronic | ICD-10-CM

## 2023-06-30 LAB
ALBUMIN SERPL ELPH-MCNC: 3.7 G/DL — SIGNIFICANT CHANGE UP (ref 3.5–5)
ALP SERPL-CCNC: 55 U/L — SIGNIFICANT CHANGE UP (ref 40–120)
ALT FLD-CCNC: 22 U/L DA — SIGNIFICANT CHANGE UP (ref 10–60)
ANION GAP SERPL CALC-SCNC: 9 MMOL/L — SIGNIFICANT CHANGE UP (ref 5–17)
APTT BLD: 32 SEC — SIGNIFICANT CHANGE UP (ref 27.5–35.5)
AST SERPL-CCNC: 16 U/L — SIGNIFICANT CHANGE UP (ref 10–40)
BASOPHILS # BLD AUTO: 0 K/UL — SIGNIFICANT CHANGE UP (ref 0–0.2)
BASOPHILS NFR BLD AUTO: 0 % — SIGNIFICANT CHANGE UP (ref 0–2)
BILIRUB SERPL-MCNC: 0.4 MG/DL — SIGNIFICANT CHANGE UP (ref 0.2–1.2)
BUN SERPL-MCNC: 12 MG/DL — SIGNIFICANT CHANGE UP (ref 7–18)
CALCIUM SERPL-MCNC: 8.5 MG/DL — SIGNIFICANT CHANGE UP (ref 8.4–10.5)
CHLORIDE SERPL-SCNC: 93 MMOL/L — LOW (ref 96–108)
CO2 SERPL-SCNC: 26 MMOL/L — SIGNIFICANT CHANGE UP (ref 22–31)
CREAT SERPL-MCNC: 0.75 MG/DL — SIGNIFICANT CHANGE UP (ref 0.5–1.3)
EGFR: 86 ML/MIN/1.73M2 — SIGNIFICANT CHANGE UP
EOSINOPHIL # BLD AUTO: 0 K/UL — SIGNIFICANT CHANGE UP (ref 0–0.5)
EOSINOPHIL NFR BLD AUTO: 0 % — SIGNIFICANT CHANGE UP (ref 0–6)
FLUAV AG NPH QL: SIGNIFICANT CHANGE UP
FLUBV AG NPH QL: SIGNIFICANT CHANGE UP
GLUCOSE SERPL-MCNC: 132 MG/DL — HIGH (ref 70–99)
HCT VFR BLD CALC: 31.8 % — LOW (ref 34.5–45)
HGB BLD-MCNC: 10.3 G/DL — LOW (ref 11.5–15.5)
IMM GRANULOCYTES NFR BLD AUTO: 0.4 % — SIGNIFICANT CHANGE UP (ref 0–0.9)
INR BLD: 1.03 RATIO — SIGNIFICANT CHANGE UP (ref 0.88–1.16)
LYMPHOCYTES # BLD AUTO: 0.59 K/UL — LOW (ref 1–3.3)
LYMPHOCYTES # BLD AUTO: 23.7 % — SIGNIFICANT CHANGE UP (ref 13–44)
MCHC RBC-ENTMCNC: 21.7 PG — LOW (ref 27–34)
MCHC RBC-ENTMCNC: 32.4 GM/DL — SIGNIFICANT CHANGE UP (ref 32–36)
MCV RBC AUTO: 67.1 FL — LOW (ref 80–100)
MONOCYTES # BLD AUTO: 0.23 K/UL — SIGNIFICANT CHANGE UP (ref 0–0.9)
MONOCYTES NFR BLD AUTO: 9.2 % — SIGNIFICANT CHANGE UP (ref 2–14)
NEUTROPHILS # BLD AUTO: 1.66 K/UL — LOW (ref 1.8–7.4)
NEUTROPHILS NFR BLD AUTO: 66.7 % — SIGNIFICANT CHANGE UP (ref 43–77)
NRBC # BLD: 0 /100 WBCS — SIGNIFICANT CHANGE UP (ref 0–0)
PLATELET # BLD AUTO: 123 K/UL — LOW (ref 150–400)
POTASSIUM SERPL-MCNC: 2.6 MMOL/L — CRITICAL LOW (ref 3.5–5.3)
POTASSIUM SERPL-SCNC: 2.6 MMOL/L — CRITICAL LOW (ref 3.5–5.3)
PROT SERPL-MCNC: 8.1 G/DL — SIGNIFICANT CHANGE UP (ref 6–8.3)
PROTHROM AB SERPL-ACNC: 12.3 SEC — SIGNIFICANT CHANGE UP (ref 10.5–13.4)
RBC # BLD: 4.74 M/UL — SIGNIFICANT CHANGE UP (ref 3.8–5.2)
RBC # FLD: 13.9 % — SIGNIFICANT CHANGE UP (ref 10.3–14.5)
SARS-COV-2 RNA SPEC QL NAA+PROBE: DETECTED
SODIUM SERPL-SCNC: 128 MMOL/L — LOW (ref 135–145)
WBC # BLD: 2.49 K/UL — LOW (ref 3.8–10.5)
WBC # FLD AUTO: 2.49 K/UL — LOW (ref 3.8–10.5)

## 2023-06-30 PROCEDURE — 71045 X-RAY EXAM CHEST 1 VIEW: CPT | Mod: 26

## 2023-06-30 PROCEDURE — 99285 EMERGENCY DEPT VISIT HI MDM: CPT

## 2023-06-30 RX ORDER — POTASSIUM CHLORIDE 20 MEQ
10 PACKET (EA) ORAL
Refills: 0 | Status: COMPLETED | OUTPATIENT
Start: 2023-06-30 | End: 2023-07-01

## 2023-06-30 RX ORDER — ONDANSETRON 8 MG/1
4 TABLET, FILM COATED ORAL ONCE
Refills: 0 | Status: COMPLETED | OUTPATIENT
Start: 2023-06-30 | End: 2023-06-30

## 2023-06-30 RX ORDER — SODIUM CHLORIDE 9 MG/ML
1000 INJECTION INTRAMUSCULAR; INTRAVENOUS; SUBCUTANEOUS ONCE
Refills: 0 | Status: COMPLETED | OUTPATIENT
Start: 2023-06-30 | End: 2023-06-30

## 2023-06-30 RX ADMIN — SODIUM CHLORIDE 1000 MILLILITER(S): 9 INJECTION INTRAMUSCULAR; INTRAVENOUS; SUBCUTANEOUS at 23:32

## 2023-06-30 NOTE — ED ADULT NURSE NOTE - OBJECTIVE STATEMENT
As per pt. c/o flu like symptoms x 3 days.  She tested COVID + by home test kit today. Denies all other symptoms

## 2023-06-30 NOTE — ED ADULT TRIAGE NOTE - CHIEF COMPLAINT QUOTE
She has fever, chills, vomiting, dizziness, body ache x 3 days.  She tested COVID + by home test kit today.

## 2023-07-01 DIAGNOSIS — R11.2 NAUSEA WITH VOMITING, UNSPECIFIED: ICD-10-CM

## 2023-07-01 DIAGNOSIS — Z86.73 PERSONAL HISTORY OF TRANSIENT ISCHEMIC ATTACK (TIA), AND CEREBRAL INFARCTION WITHOUT RESIDUAL DEFICITS: ICD-10-CM

## 2023-07-01 DIAGNOSIS — D61.818 OTHER PANCYTOPENIA: ICD-10-CM

## 2023-07-01 DIAGNOSIS — U07.1 COVID-19: ICD-10-CM

## 2023-07-01 DIAGNOSIS — R77.8 OTHER SPECIFIED ABNORMALITIES OF PLASMA PROTEINS: ICD-10-CM

## 2023-07-01 DIAGNOSIS — E87.1 HYPO-OSMOLALITY AND HYPONATREMIA: ICD-10-CM

## 2023-07-01 DIAGNOSIS — Z29.9 ENCOUNTER FOR PROPHYLACTIC MEASURES, UNSPECIFIED: ICD-10-CM

## 2023-07-01 DIAGNOSIS — E87.6 HYPOKALEMIA: ICD-10-CM

## 2023-07-01 DIAGNOSIS — I10 ESSENTIAL (PRIMARY) HYPERTENSION: ICD-10-CM

## 2023-07-01 LAB
ALBUMIN SERPL ELPH-MCNC: 3.2 G/DL — LOW (ref 3.5–5)
ALP SERPL-CCNC: 47 U/L — SIGNIFICANT CHANGE UP (ref 40–120)
ALT FLD-CCNC: 21 U/L DA — SIGNIFICANT CHANGE UP (ref 10–60)
ANION GAP SERPL CALC-SCNC: 10 MMOL/L — SIGNIFICANT CHANGE UP (ref 5–17)
AST SERPL-CCNC: 26 U/L — SIGNIFICANT CHANGE UP (ref 10–40)
BILIRUB SERPL-MCNC: 0.3 MG/DL — SIGNIFICANT CHANGE UP (ref 0.2–1.2)
BUN SERPL-MCNC: 11 MG/DL — SIGNIFICANT CHANGE UP (ref 7–18)
CALCIUM SERPL-MCNC: 8.1 MG/DL — LOW (ref 8.4–10.5)
CHLORIDE SERPL-SCNC: 106 MMOL/L — SIGNIFICANT CHANGE UP (ref 96–108)
CHLORIDE UR-SCNC: 107 MMOL/L — SIGNIFICANT CHANGE UP
CK MB BLD-MCNC: 1.1 % — SIGNIFICANT CHANGE UP (ref 0–3.5)
CK MB CFR SERPL CALC: 1.5 NG/ML — SIGNIFICANT CHANGE UP (ref 0–3.6)
CK SERPL-CCNC: 136 U/L — SIGNIFICANT CHANGE UP (ref 21–215)
CO2 SERPL-SCNC: 20 MMOL/L — LOW (ref 22–31)
CREAT ?TM UR-MCNC: <13 MG/DL — SIGNIFICANT CHANGE UP
CREAT SERPL-MCNC: 0.84 MG/DL — SIGNIFICANT CHANGE UP (ref 0.5–1.3)
EGFR: 75 ML/MIN/1.73M2 — SIGNIFICANT CHANGE UP
GLUCOSE SERPL-MCNC: 103 MG/DL — HIGH (ref 70–99)
MAGNESIUM SERPL-MCNC: 1.7 MG/DL — SIGNIFICANT CHANGE UP (ref 1.6–2.6)
PHOSPHATE SERPL-MCNC: 3.1 MG/DL — SIGNIFICANT CHANGE UP (ref 2.5–4.5)
POTASSIUM SERPL-MCNC: 3.7 MMOL/L — SIGNIFICANT CHANGE UP (ref 3.5–5.3)
POTASSIUM SERPL-SCNC: 3.7 MMOL/L — SIGNIFICANT CHANGE UP (ref 3.5–5.3)
POTASSIUM UR-SCNC: 22 MMOL/L — SIGNIFICANT CHANGE UP
PROT SERPL-MCNC: 7.7 G/DL — SIGNIFICANT CHANGE UP (ref 6–8.3)
SODIUM SERPL-SCNC: 136 MMOL/L — SIGNIFICANT CHANGE UP (ref 135–145)
SODIUM UR-SCNC: 101 MMOL/L — SIGNIFICANT CHANGE UP
TROPONIN I, HIGH SENSITIVITY RESULT: 142.9 NG/L — HIGH
TROPONIN I, HIGH SENSITIVITY RESULT: 158.3 NG/L — HIGH
TROPONIN I, HIGH SENSITIVITY RESULT: 192.2 NG/L — HIGH
TROPONIN I, HIGH SENSITIVITY RESULT: 193.7 NG/L — HIGH

## 2023-07-01 PROCEDURE — 99223 1ST HOSP IP/OBS HIGH 75: CPT

## 2023-07-01 RX ORDER — PANTOPRAZOLE SODIUM 20 MG/1
40 TABLET, DELAYED RELEASE ORAL
Refills: 0 | Status: DISCONTINUED | OUTPATIENT
Start: 2023-07-01 | End: 2023-07-02

## 2023-07-01 RX ORDER — ONDANSETRON 8 MG/1
4 TABLET, FILM COATED ORAL EVERY 6 HOURS
Refills: 0 | Status: DISCONTINUED | OUTPATIENT
Start: 2023-07-01 | End: 2023-07-02

## 2023-07-01 RX ORDER — ENOXAPARIN SODIUM 100 MG/ML
40 INJECTION SUBCUTANEOUS EVERY 24 HOURS
Refills: 0 | Status: DISCONTINUED | OUTPATIENT
Start: 2023-07-01 | End: 2023-07-02

## 2023-07-01 RX ORDER — ONDANSETRON 8 MG/1
4 TABLET, FILM COATED ORAL ONCE
Refills: 0 | Status: DISCONTINUED | OUTPATIENT
Start: 2023-07-01 | End: 2023-07-01

## 2023-07-01 RX ORDER — SODIUM CHLORIDE 9 MG/ML
1000 INJECTION, SOLUTION INTRAVENOUS
Refills: 0 | Status: DISCONTINUED | OUTPATIENT
Start: 2023-07-01 | End: 2023-07-02

## 2023-07-01 RX ORDER — SODIUM CHLORIDE 9 MG/ML
1000 INJECTION INTRAMUSCULAR; INTRAVENOUS; SUBCUTANEOUS
Refills: 0 | Status: DISCONTINUED | OUTPATIENT
Start: 2023-07-01 | End: 2023-07-01

## 2023-07-01 RX ORDER — KETOROLAC TROMETHAMINE 30 MG/ML
15 SYRINGE (ML) INJECTION ONCE
Refills: 0 | Status: DISCONTINUED | OUTPATIENT
Start: 2023-07-01 | End: 2023-07-01

## 2023-07-01 RX ORDER — AMLODIPINE BESYLATE 2.5 MG/1
5 TABLET ORAL DAILY
Refills: 0 | Status: DISCONTINUED | OUTPATIENT
Start: 2023-07-01 | End: 2023-07-02

## 2023-07-01 RX ORDER — METOCLOPRAMIDE HCL 10 MG
5 TABLET ORAL
Refills: 0 | Status: DISCONTINUED | OUTPATIENT
Start: 2023-07-01 | End: 2023-07-01

## 2023-07-01 RX ORDER — ONDANSETRON 8 MG/1
4 TABLET, FILM COATED ORAL ONCE
Refills: 0 | Status: COMPLETED | OUTPATIENT
Start: 2023-07-01 | End: 2023-07-01

## 2023-07-01 RX ADMIN — Medication 100 MILLIEQUIVALENT(S): at 01:40

## 2023-07-01 RX ADMIN — AMLODIPINE BESYLATE 5 MILLIGRAM(S): 2.5 TABLET ORAL at 06:41

## 2023-07-01 RX ADMIN — Medication 15 MILLIGRAM(S): at 05:52

## 2023-07-01 RX ADMIN — ONDANSETRON 4 MILLIGRAM(S): 8 TABLET, FILM COATED ORAL at 00:32

## 2023-07-01 RX ADMIN — ENOXAPARIN SODIUM 40 MILLIGRAM(S): 100 INJECTION SUBCUTANEOUS at 14:10

## 2023-07-01 RX ADMIN — SODIUM CHLORIDE 75 MILLILITER(S): 9 INJECTION INTRAMUSCULAR; INTRAVENOUS; SUBCUTANEOUS at 05:54

## 2023-07-01 RX ADMIN — Medication 100 MILLIEQUIVALENT(S): at 00:32

## 2023-07-01 RX ADMIN — Medication 100 MILLIEQUIVALENT(S): at 02:48

## 2023-07-01 RX ADMIN — Medication 15 MILLIGRAM(S): at 05:57

## 2023-07-01 RX ADMIN — ONDANSETRON 4 MILLIGRAM(S): 8 TABLET, FILM COATED ORAL at 01:55

## 2023-07-01 RX ADMIN — PANTOPRAZOLE SODIUM 40 MILLIGRAM(S): 20 TABLET, DELAYED RELEASE ORAL at 19:01

## 2023-07-01 NOTE — H&P ADULT - PROBLEM SELECTOR PLAN 2
p/w nausea and vomiting   Na of 128 on admission  likely hypovolemic hypoNa  s/p IV NS bolus in ED  f/u CMP   replete accordingly [goal not to increase more than 4-6 mEq in 24 hrs]  f/u urine lytes and urine osm p/w nausea and vomiting   Na of 128 on admission  likely hypovolemic hypoNa  urine lytes obtained after fluid resuscitation   s/p IV NS bolus in ED  repeat Na 136   will d/c IVF as pt met the goal p/w nausea and vomiting   K of 2.6 on admission  s/p KCl IV 10 mEq x3 in ED  repeat K 3.6  replete accordingly

## 2023-07-01 NOTE — H&P ADULT - HISTORY OF PRESENT ILLNESS
67 yrs old F from home ambulating without assistance with pmhx of HTN and CVA on 2019 presented with nausea and multiple episodes of vomiting for the past 2 days. Pt stated that she vomited once yesterday and 5 times today mainly water without any blood. Pt reported decreased oral intake however denied any abdominal pain, diarrhea, upper respiratory symptoms. chest pain, palpitation, dizziness, urinary symptoms, fever, chills or sweating. Pt denied any recent travel or sick contacts. She takes medication for cholesterol, blood pressure however does not remember the name or dosage. She denied smoking, alcohol consumption, or use of illicit drugs. GOC was discussed, Pt is FULL CODE.

## 2023-07-01 NOTE — CHART NOTE - NSCHARTNOTEFT_GEN_A_CORE
Patient still complaining of persistent light-headedness. Tolerated eating fish for lunch, but reports she is nauseous afterwards. Will give limited PRN zofran, not in large quantity. Symptoms most likely due to COVID infection. Continue on IV fluids. Discharge home pending improvement in symptoms.

## 2023-07-01 NOTE — H&P ADULT - ATTENDING COMMENTS
67 year old woman with HTN here with fevers, fatigue, emesis and generalized weakness.    Vital Signs Last 24 Hrs  T(C): 36.4 (01 Jul 2023 00:30), Max: 36.8 (30 Jun 2023 21:17)  T(F): 97.5 (01 Jul 2023 00:30), Max: 98.3 (30 Jun 2023 21:17)  HR: 49 (01 Jul 2023 02:29) (49 - 60)  BP: 171/80 (01 Jul 2023 00:30) (171/80 - 180/89)  RR: 18 (01 Jul 2023 02:29) (18 - 20)  SpO2: 100% (01 Jul 2023 02:29) (98% - 100%)  Parameters below as of 01 Jul 2023 02:29  Patient On (Oxygen Delivery Method): room air    Labs reviewed  - Pancytopenia with microcytosis  stable hgb and PLT  similar to her last admission   - Elevated troponin similar to last presentation - 143  - Electrolyte deficits   na -128; K - 2.6, Cl 93  Normal renal function   Covid +ve     CXR reviewed by me   No acute lung process    Impression   67 year old woman presenting with symptoms concerning for acute viral infection noted with +ve covid testing. Her cbc, chem and troponin are similar to her last presentation in november 2022 when she was admitted for a similar viral infection.   Would admit for work up and management     A/P  - Elevated troponin - most likely demand ischemia vs ?? NSTEMI   - Acute COVID infection   - Hyponatremia   - Hypokalemia   - Pancytopenia - similar to last admission   Not sure this is an acute viral reaction or her baseline     Plan   - Admit to Medicine  - Repeat troponin; if uptrending significantly, would initiate NSTEMI protocol  - Supportive care with antipyretics, pain control, antiemetics and antitussives  - Check Mg level and replace if low  - replenish potassium   - Check urine Na and osmolality   - IVF hydration   - Re-evaluate cbc post infection ; follow up with PCP+/- hematologist  -

## 2023-07-01 NOTE — H&P ADULT - ASSESSMENT
67 yrs old F from home ambulating without assistance with pmhx of HTN and CVA on 2019 presented with nausea and multiple episodes of vomiting for the past 2 days. Pt noted to have hypokalemia and elevated troponin with positive COVID test in ED. Pt is admitted for electrolyte replacement, and ACS rule out.     Primary team to kindly confirm MED RECS in AM.

## 2023-07-01 NOTE — CHART NOTE - NSCHARTNOTEFT_GEN_A_CORE
Patient does not report any chest pain.    Troponin at 20:12 was 193.7, mild upward trend from 16:02 which was 192.2.  Repeat troponin scheduled for 23:30 Patient does not report any chest pain.    Troponin at 20:12 was 193.7, mild upward trend from 16:02 which was 192.2.  Repeat troponin scheduled for 23:30      follow up:   Trop was trended overnight, as pt was asymptomatic with no EKG changes  Peaked and trended down,  Dr. Berger consulted in the AM

## 2023-07-01 NOTE — H&P ADULT - PROBLEM SELECTOR PLAN 3
multiple episodes of vomiting, w/o abdominal pain   +ve COVID and elevated trop  s/p Zofran  pt had high QTc will continue with Metoclopramide for now multiple episodes of vomiting, w/o abdominal pain   +ve COVID and elevated trop  s/p Zofran  will be cautious with rx of vomiting as pt has prolonged QTc p/w nausea and vomiting   Na of 128 on admission  likely hypovolemic hypoNa  urine lytes obtained after fluid resuscitation   s/p IV NS bolus in ED  repeat Na 136   will d/c IVF as pt met the goal

## 2023-07-01 NOTE — ED PROVIDER NOTE - CARE PLAN
1 Principal Discharge DX:	2019 novel coronavirus disease (COVID-19)  Secondary Diagnosis:	Non-ST elevation MI (NSTEMI)  Secondary Diagnosis:	Hypokalemia  Secondary Diagnosis:	Dizziness

## 2023-07-01 NOTE — ED PROVIDER NOTE - CLINICAL SUMMARY MEDICAL DECISION MAKING FREE TEXT BOX
Patient with vomiting malaise slight cough.  COVID-positive will do IV fluids replete potassium reassess.

## 2023-07-01 NOTE — ED PROVIDER NOTE - PROGRESS NOTE DETAILS
Labs reveal elevated troponin, low Na, low K. unchanged EKG. Patient denies chest pain. admit for repletion, cardiac workup

## 2023-07-01 NOTE — ED PROVIDER NOTE - OBJECTIVE STATEMENT
Patient with hypertension presents with several days of malaise.  Today patient started vomiting and vomited more than 5 times and is feeling overall dizzy and weak.  Then family did a COVID test and it came back positive.  Patient has slight cough.  Denies any abdominal pain diarrhea or chest pain.

## 2023-07-01 NOTE — H&P ADULT - PROBLEM SELECTOR PLAN 6
WBC 2.49, Hb 10.3, Plt 123 on this admission  some element of cytopenia noted on previous admission   no active bleeding, lymphadenopathy noted   may be in the setting of acute infection   monitor CMP  will consider heme/ onc referral upon discharge WBC 2.49, Hb 10.3, Plt 123 on this admission  some element of cytopenia noted on previous admission   no active bleeding, lymphadenopathy noted   may be in the setting of acute infection   monitor CMP  will consider heme/ onc referral upon discharge after resolution of acute infection p/w nausea and vomiting, +ve COVID  Trop x1: 142.9   EKG : T wave inversion in inferolateral leads without ST elevation or depression unchanged from last admission  Pt had elevated trop on previous admissions  trop x2 slightly increased to 158  c/w trending trop  likely demand ischemia vs NSTEMI

## 2023-07-01 NOTE — H&P ADULT - PROBLEM SELECTOR PLAN 5
p/w nausea and vomiting, +ve COVID  Trop x1: 142.9   EKG : T wave inversion in inferiolateral leads without ST elevation or depression  Pt had elevated trop on previous admissions  f/u repeat Trop and EKG  likely demand ischemia vs NSTEMI p/w nausea and vomiting, +ve COVID  Trop x1: 142.9   EKG : T wave inversion in inferolateral leads without ST elevation or depression unchanged from last admission  Pt had elevated trop on previous admissions  trop x2 slightly increased to 158  c/w trending trop  likely demand ischemia vs NSTEMI multiple episodes of vomiting, w/o abdominal pain   +ve COVID and elevated trop  s/p Zofran  will be cautious with rx of vomiting as pt has prolonged QTc

## 2023-07-01 NOTE — ED PROVIDER NOTE - RESPIRATORY, MLM
[Pathological] : TNM Stage: p [IA] : IA [TTNM] : 1b [NTNM] : 0 [MTNM] : 0 Breath sounds clear and equal bilaterally.

## 2023-07-01 NOTE — H&P ADULT - PROBLEM SELECTOR PLAN 7
pmhx of HTN   primary team to kindly confirm med recs in AM   will give Amlodipine 5 mg now   monitor BP pmhx of HTN   primary team to kindly confirm med recs in AM   will give Amlodipine 5 mg now until med recs is performed and home medication is prescribed   monitor BP

## 2023-07-01 NOTE — H&P ADULT - NSHPREVIEWOFSYSTEMS_GEN_ALL_CORE
REVIEW OF SYSTEMS:    CONSTITUTIONAL: No weakness, fevers or chills  EYES/ENT: No visual changes;  No vertigo or throat pain   NECK: No pain or stiffness  RESPIRATORY: No cough, wheezing, hemoptysis; No shortness of breath  CARDIOVASCULAR: No chest pain or palpitations  GASTROINTESTINAL: No abdominal or epigastric pain. + nausea and vomiting, No hematemesis; No diarrhea or constipation. No melena or hematochezia.  GENITOURINARY: No dysuria, frequency or hematuria  NEUROLOGICAL: No numbness or weakness  SKIN: No itching, rashes

## 2023-07-01 NOTE — PATIENT PROFILE ADULT - FALL HARM RISK - HARM RISK INTERVENTIONS

## 2023-07-01 NOTE — H&P ADULT - PROBLEM SELECTOR PLAN 1
p/w nausea and vomiting   K of 2.6 on admission  s/p KCl IV 10 mEq x3 in ED  f/u CMP   replete accordingly p/w nausea and vomiting   K of 2.6 on admission  s/p KCl IV 10 mEq x3 in ED  repeat K 3.6  replete accordingly + COVID in ED  Sat 98% on RA  CXR normal pending official read  as pt is currently sat well without respiratory symptoms will hold off the Remdesivir and Decadron   will consider starting these meds if she becomes symptomatic  c/w supportive treatment

## 2023-07-01 NOTE — H&P ADULT - PROBLEM SELECTOR PLAN 4
+ COVID in ED  Sat 98% on RA  CXR normal pending official read  as pt is currently sat well without respiratory symptoms will hold off the Remdesivir and Decadron   will consider starting these meds if she becomes symptomatic  c/w supportive treatment WBC 2.49, Hb 10.3, Plt 123 on this admission  some element of cytopenia noted on previous admission   no active bleeding, lymphadenopathy noted   may be in the setting of acute infection   monitor CMP  will consider heme/ onc referral upon discharge after resolution of acute infection

## 2023-07-02 VITALS
HEART RATE: 55 BPM | OXYGEN SATURATION: 98 % | RESPIRATION RATE: 18 BRPM | SYSTOLIC BLOOD PRESSURE: 152 MMHG | DIASTOLIC BLOOD PRESSURE: 76 MMHG | TEMPERATURE: 99 F

## 2023-07-02 LAB
ALBUMIN SERPL ELPH-MCNC: 3.2 G/DL — LOW (ref 3.5–5)
ALP SERPL-CCNC: 48 U/L — SIGNIFICANT CHANGE UP (ref 40–120)
ALT FLD-CCNC: 25 U/L DA — SIGNIFICANT CHANGE UP (ref 10–60)
ANION GAP SERPL CALC-SCNC: 6 MMOL/L — SIGNIFICANT CHANGE UP (ref 5–17)
AST SERPL-CCNC: 20 U/L — SIGNIFICANT CHANGE UP (ref 10–40)
BASOPHILS # BLD AUTO: 0.01 K/UL — SIGNIFICANT CHANGE UP (ref 0–0.2)
BASOPHILS NFR BLD AUTO: 0.2 % — SIGNIFICANT CHANGE UP (ref 0–2)
BILIRUB SERPL-MCNC: 0.2 MG/DL — SIGNIFICANT CHANGE UP (ref 0.2–1.2)
BUN SERPL-MCNC: 15 MG/DL — SIGNIFICANT CHANGE UP (ref 7–18)
CALCIUM SERPL-MCNC: 8.4 MG/DL — SIGNIFICANT CHANGE UP (ref 8.4–10.5)
CHLORIDE SERPL-SCNC: 108 MMOL/L — SIGNIFICANT CHANGE UP (ref 96–108)
CO2 SERPL-SCNC: 26 MMOL/L — SIGNIFICANT CHANGE UP (ref 22–31)
CREAT SERPL-MCNC: 0.9 MG/DL — SIGNIFICANT CHANGE UP (ref 0.5–1.3)
EGFR: 69 ML/MIN/1.73M2 — SIGNIFICANT CHANGE UP
EOSINOPHIL # BLD AUTO: 0.03 K/UL — SIGNIFICANT CHANGE UP (ref 0–0.5)
EOSINOPHIL NFR BLD AUTO: 0.7 % — SIGNIFICANT CHANGE UP (ref 0–6)
GLUCOSE SERPL-MCNC: 93 MG/DL — SIGNIFICANT CHANGE UP (ref 70–99)
HCT VFR BLD CALC: 32.5 % — LOW (ref 34.5–45)
HGB BLD-MCNC: 10.3 G/DL — LOW (ref 11.5–15.5)
IMM GRANULOCYTES NFR BLD AUTO: 0.2 % — SIGNIFICANT CHANGE UP (ref 0–0.9)
LYMPHOCYTES # BLD AUTO: 2.09 K/UL — SIGNIFICANT CHANGE UP (ref 1–3.3)
LYMPHOCYTES # BLD AUTO: 51.9 % — HIGH (ref 13–44)
MAGNESIUM SERPL-MCNC: 2.1 MG/DL — SIGNIFICANT CHANGE UP (ref 1.6–2.6)
MCHC RBC-ENTMCNC: 21.7 PG — LOW (ref 27–34)
MCHC RBC-ENTMCNC: 31.7 GM/DL — LOW (ref 32–36)
MCV RBC AUTO: 68.4 FL — LOW (ref 80–100)
MONOCYTES # BLD AUTO: 0.43 K/UL — SIGNIFICANT CHANGE UP (ref 0–0.9)
MONOCYTES NFR BLD AUTO: 10.7 % — SIGNIFICANT CHANGE UP (ref 2–14)
NEUTROPHILS # BLD AUTO: 1.46 K/UL — LOW (ref 1.8–7.4)
NEUTROPHILS NFR BLD AUTO: 36.3 % — LOW (ref 43–77)
NRBC # BLD: 0 /100 WBCS — SIGNIFICANT CHANGE UP (ref 0–0)
PHOSPHATE SERPL-MCNC: 2.3 MG/DL — LOW (ref 2.5–4.5)
PLATELET # BLD AUTO: 128 K/UL — LOW (ref 150–400)
POTASSIUM SERPL-MCNC: 3 MMOL/L — LOW (ref 3.5–5.3)
POTASSIUM SERPL-SCNC: 3 MMOL/L — LOW (ref 3.5–5.3)
PROT SERPL-MCNC: 7.2 G/DL — SIGNIFICANT CHANGE UP (ref 6–8.3)
RBC # BLD: 4.75 M/UL — SIGNIFICANT CHANGE UP (ref 3.8–5.2)
RBC # FLD: 14.6 % — HIGH (ref 10.3–14.5)
SODIUM SERPL-SCNC: 140 MMOL/L — SIGNIFICANT CHANGE UP (ref 135–145)
TROPONIN I, HIGH SENSITIVITY RESULT: 209.9 NG/L — HIGH
TROPONIN I, HIGH SENSITIVITY RESULT: 211.4 NG/L — HIGH
TROPONIN I, HIGH SENSITIVITY RESULT: 215.3 NG/L — HIGH
TSH SERPL-MCNC: 3.87 UU/ML — SIGNIFICANT CHANGE UP (ref 0.34–4.82)
WBC # BLD: 4.03 K/UL — SIGNIFICANT CHANGE UP (ref 3.8–10.5)
WBC # FLD AUTO: 4.03 K/UL — SIGNIFICANT CHANGE UP (ref 3.8–10.5)

## 2023-07-02 PROCEDURE — 85025 COMPLETE CBC W/AUTO DIFF WBC: CPT

## 2023-07-02 PROCEDURE — 80053 COMPREHEN METABOLIC PANEL: CPT

## 2023-07-02 PROCEDURE — 99285 EMERGENCY DEPT VISIT HI MDM: CPT

## 2023-07-02 PROCEDURE — 82436 ASSAY OF URINE CHLORIDE: CPT

## 2023-07-02 PROCEDURE — 71045 X-RAY EXAM CHEST 1 VIEW: CPT

## 2023-07-02 PROCEDURE — 83880 ASSAY OF NATRIURETIC PEPTIDE: CPT

## 2023-07-02 PROCEDURE — 85610 PROTHROMBIN TIME: CPT

## 2023-07-02 PROCEDURE — 36415 COLL VENOUS BLD VENIPUNCTURE: CPT

## 2023-07-02 PROCEDURE — 82550 ASSAY OF CK (CPK): CPT

## 2023-07-02 PROCEDURE — 96375 TX/PRO/DX INJ NEW DRUG ADDON: CPT

## 2023-07-02 PROCEDURE — 99239 HOSP IP/OBS DSCHRG MGMT >30: CPT

## 2023-07-02 PROCEDURE — 93005 ELECTROCARDIOGRAM TRACING: CPT

## 2023-07-02 PROCEDURE — 84443 ASSAY THYROID STIM HORMONE: CPT

## 2023-07-02 PROCEDURE — 84133 ASSAY OF URINE POTASSIUM: CPT

## 2023-07-02 PROCEDURE — 87040 BLOOD CULTURE FOR BACTERIA: CPT

## 2023-07-02 PROCEDURE — 96376 TX/PRO/DX INJ SAME DRUG ADON: CPT

## 2023-07-02 PROCEDURE — 84484 ASSAY OF TROPONIN QUANT: CPT

## 2023-07-02 PROCEDURE — 85730 THROMBOPLASTIN TIME PARTIAL: CPT

## 2023-07-02 PROCEDURE — 84300 ASSAY OF URINE SODIUM: CPT

## 2023-07-02 PROCEDURE — 84100 ASSAY OF PHOSPHORUS: CPT

## 2023-07-02 PROCEDURE — 83735 ASSAY OF MAGNESIUM: CPT

## 2023-07-02 PROCEDURE — 82553 CREATINE MB FRACTION: CPT

## 2023-07-02 PROCEDURE — 96374 THER/PROPH/DIAG INJ IV PUSH: CPT

## 2023-07-02 PROCEDURE — 82570 ASSAY OF URINE CREATININE: CPT

## 2023-07-02 PROCEDURE — 87637 SARSCOV2&INF A&B&RSV AMP PRB: CPT

## 2023-07-02 RX ORDER — VENLAFAXINE HCL 75 MG
0 CAPSULE, EXT RELEASE 24 HR ORAL
Qty: 0 | Refills: 0 | DISCHARGE

## 2023-07-02 RX ORDER — RANOLAZINE 500 MG/1
1 TABLET, FILM COATED, EXTENDED RELEASE ORAL
Refills: 0 | DISCHARGE

## 2023-07-02 RX ORDER — POTASSIUM CHLORIDE 20 MEQ
40 PACKET (EA) ORAL
Refills: 0 | Status: DISCONTINUED | OUTPATIENT
Start: 2023-07-02 | End: 2023-07-02

## 2023-07-02 RX ORDER — ATORVASTATIN CALCIUM 80 MG/1
1 TABLET, FILM COATED ORAL
Refills: 0 | DISCHARGE

## 2023-07-02 RX ORDER — NIFEDIPINE 30 MG
0 TABLET, EXTENDED RELEASE 24 HR ORAL
Qty: 0 | Refills: 0 | DISCHARGE

## 2023-07-02 RX ORDER — ICOSAPENT ETHYL 500 MG/1
4 CAPSULE, LIQUID FILLED ORAL
Refills: 0 | DISCHARGE

## 2023-07-02 RX ORDER — SERTRALINE 25 MG/1
1 TABLET, FILM COATED ORAL
Refills: 0 | DISCHARGE

## 2023-07-02 RX ORDER — GABAPENTIN 400 MG/1
0 CAPSULE ORAL
Qty: 0 | Refills: 0 | DISCHARGE

## 2023-07-02 RX ORDER — ATORVASTATIN CALCIUM 80 MG/1
0 TABLET, FILM COATED ORAL
Qty: 0 | Refills: 0 | DISCHARGE

## 2023-07-02 RX ORDER — HYDRALAZINE HCL 50 MG
1 TABLET ORAL
Refills: 0 | DISCHARGE

## 2023-07-02 RX ORDER — OLMESARTAN MEDOXOMIL 5 MG/1
1 TABLET, FILM COATED ORAL
Refills: 0 | DISCHARGE

## 2023-07-02 RX ORDER — SODIUM,POTASSIUM PHOSPHATES 278-250MG
1 POWDER IN PACKET (EA) ORAL ONCE
Refills: 0 | Status: COMPLETED | OUTPATIENT
Start: 2023-07-02 | End: 2023-07-02

## 2023-07-02 RX ORDER — RALOXIFENE HYDROCHLORIDE 60 MG/1
0 TABLET, COATED ORAL
Qty: 0 | Refills: 0 | DISCHARGE

## 2023-07-02 RX ORDER — ISOSORBIDE MONONITRATE 60 MG/1
0 TABLET, EXTENDED RELEASE ORAL
Qty: 0 | Refills: 0 | DISCHARGE

## 2023-07-02 RX ORDER — MECLIZINE HCL 12.5 MG
0 TABLET ORAL
Qty: 0 | Refills: 0 | DISCHARGE

## 2023-07-02 RX ORDER — POTASSIUM CHLORIDE 20 MEQ
40 PACKET (EA) ORAL ONCE
Refills: 0 | Status: COMPLETED | OUTPATIENT
Start: 2023-07-02 | End: 2023-07-02

## 2023-07-02 RX ORDER — OLMESARTAN MEDOXOMIL 5 MG/1
0 TABLET, FILM COATED ORAL
Qty: 0 | Refills: 0 | DISCHARGE

## 2023-07-02 RX ORDER — NIFEDIPINE 30 MG
1 TABLET, EXTENDED RELEASE 24 HR ORAL
Refills: 0 | DISCHARGE

## 2023-07-02 RX ORDER — CLOPIDOGREL BISULFATE 75 MG/1
0 TABLET, FILM COATED ORAL
Qty: 0 | Refills: 0 | DISCHARGE

## 2023-07-02 RX ORDER — DONEPEZIL HYDROCHLORIDE 10 MG/1
1 TABLET, FILM COATED ORAL
Refills: 0 | DISCHARGE

## 2023-07-02 RX ORDER — HYDRALAZINE HCL 50 MG
0 TABLET ORAL
Qty: 0 | Refills: 0 | DISCHARGE

## 2023-07-02 RX ORDER — RALOXIFENE HYDROCHLORIDE 60 MG/1
1 TABLET, COATED ORAL
Refills: 0 | DISCHARGE

## 2023-07-02 RX ADMIN — AMLODIPINE BESYLATE 5 MILLIGRAM(S): 2.5 TABLET ORAL at 06:01

## 2023-07-02 RX ADMIN — ENOXAPARIN SODIUM 40 MILLIGRAM(S): 100 INJECTION SUBCUTANEOUS at 08:28

## 2023-07-02 RX ADMIN — Medication 40 MILLIEQUIVALENT(S): at 13:42

## 2023-07-02 RX ADMIN — Medication 1 PACKET(S): at 14:45

## 2023-07-02 RX ADMIN — PANTOPRAZOLE SODIUM 40 MILLIGRAM(S): 20 TABLET, DELAYED RELEASE ORAL at 08:29

## 2023-07-02 NOTE — PROGRESS NOTE ADULT - SUBJECTIVE AND OBJECTIVE BOX
PGY-1 Progress Note discussed with attending    PAGER #: [[phone]] TILL 5:00 PM  PLEASE CONTACT ON CALL TEAM:  - On Call Team (Please refer to Omid) FROM 5:00 PM - 8:30PM  - Nightfloat Team FROM 8:30 -7:30 AM  -  Alex  : [phone]  CHIEF COMPLAINT & BRIEF HOSPITAL COURSE:    INTERVAL HPI/OVERNIGHT EVENTS: no acute events   MEDICATIONS  (STANDING):  amLODIPine   Tablet 5 milliGRAM(s) Oral daily  enoxaparin Injectable 40 milliGRAM(s) SubCutaneous every 24 hours  lactated ringers. 1000 milliLiter(s) (75 mL/Hr) IV Continuous <Continuous>  pantoprazole    Tablet 40 milliGRAM(s) Oral before breakfast  potassium chloride   Powder 40 milliEquivalent(s) Oral two times a day  potassium phosphate / sodium phosphate Powder (PHOS-NaK) 1 Packet(s) Oral once    MEDICATIONS  (PRN):  ondansetron Injectable 4 milliGRAM(s) IV Push every 6 hours PRN Nausea and/or Vomiting      REVIEW OF SYSTEMS:  CONSTITUTIONAL: No fever, weight loss, or fatigue  RESPIRATORY: No shortness of breath  CARDIOVASCULAR: No chest pain  GASTROINTESTINAL: No abdominal pain.  GENITOURINARY: No dysuria  NEUROLOGICAL: No headaches  SKIN: No itching, burning, rashes    Vital Signs Last 24 Hrs  T(C): 37 (02 Jul 2023 07:22), Max: 37.4 (01 Jul 2023 11:30)  T(F): 98.6 (02 Jul 2023 07:22), Max: 99.3 (01 Jul 2023 11:30)  HR: 44 (02 Jul 2023 07:22) (43 - 48)  BP: 159/65 (02 Jul 2023 07:22) (147/65 - 164/73)  BP(mean): --  RR: 18 (02 Jul 2023 07:22) (16 - 20)  SpO2: 97% (02 Jul 2023 07:22) (94% - 100%)    Parameters below as of 02 Jul 2023 07:22  Patient On (Oxygen Delivery Method): room air        PHYSICAL EXAMINATION:  GENERAL: NAD, well built  HEAD:  Atraumatic, Normocephalic  EYES:  conjunctiva and sclera clear  CHEST/LUNG: Clear to auscultation. No rales, rhonchi, wheezing, or rubs  HEART: Regular rate and rhythm; No murmurs, rubs, or gallops  ABDOMEN: Soft, Nontender, Nondistended; Bowel sounds present  NERVOUS SYSTEM:  Alert & Oriented X3,    EXTREMITIES:  2+ Peripheral Pulses, No clubbing, cyanosis, or edema  SKIN: warm dry                          10.3   4.03  )-----------( 128      ( 02 Jul 2023 06:20 )             32.5     07-02    140  |  108  |  15  ----------------------------<  93  3.0<L>   |  26  |  0.90    Ca    8.4      02 Jul 2023 06:20  Phos  2.3     07-02  Mg     2.1     07-02    TPro  7.2  /  Alb  3.2<L>  /  TBili  0.2  /  DBili  x   /  AST  20  /  ALT  25  /  AlkPhos  48  07-02    LIVER FUNCTIONS - ( 02 Jul 2023 06:20 )  Alb: 3.2 g/dL / Pro: 7.2 g/dL / ALK PHOS: 48 U/L / ALT: 25 U/L DA / AST: 20 U/L / GGT: x           CARDIAC MARKERS ( 01 Jul 2023 04:00 )  x     / x     / 136 U/L / x     / 1.5 ng/mL  CARDIAC MARKERS ( 30 Jun 2023 22:30 )  x     / x     / 106 U/L / x     / x          PT/INR - ( 30 Jun 2023 22:30 )   PT: 12.3 sec;   INR: 1.03 ratio         PTT - ( 30 Jun 2023 22:30 )  PTT:32.0 sec    CAPILLARY BLOOD GLUCOSE      RADIOLOGY & ADDITIONAL TESTS:

## 2023-07-02 NOTE — PROGRESS NOTE ADULT - PROBLEM SELECTOR PLAN 2
p/w  n/v  K of 2.6 on admission. Today it is 3.0.  s/p KCl IV 10 mEq x3 in ED  repeat K 3.6  replete accordingly

## 2023-07-02 NOTE — DISCHARGE NOTE PROVIDER - PROVIDER TOKENS
PROVIDER:[TOKEN:[[id number]],FOLLOWUP:[1 week]] PROVIDER:[TOKEN:[[id number]],FOLLOWUP:[1 week]],PROVIDER:[TOKEN:[[id number]],FOLLOWUP:[1 week]]

## 2023-07-02 NOTE — DISCHARGE NOTE PROVIDER - NSDCCAREPROVSEEN_GEN_ALL_CORE_FT
Susan, Neymar Sauer, Shana Emerson, Tamika De La Cruz, Deloris Berger, Krystal Mckinley, Mukehs Roberts, Gladys Durham, Faye Velázquez, Jeannine Sanchez, Nica Landaverde, Summer

## 2023-07-02 NOTE — DISCHARGE NOTE PROVIDER - HOSPITAL COURSE
67F from home ambulating without assistance with pmhx of HTN and CVA on 2019 presented with nausea and multiple episodes of vomiting for the past 2 days. Pt noted to have hypokalemia and elevated troponin with positive COVID test in ED. Pt is admitted for electrolyte replacement, and ACS rule out.     #2019 novel coronavirus disease (COVID-19).  + COVID in ED Sat 98% on RA CXR normal pending official read as pt is currently sat well without respiratory symptoms will hold off the Remdesivir and Decadron     #Hypokalemia.  Plan: p/w nausea and vomiting  K of 2.6 on admission s/p KCl IV 10 mEq x3 in ED    #Hyponatremia.  ·  Plan: p/w nausea and vomiting   Na of 128 on admission  likely hypovolemic hypoNa  urine lytes obtained after fluid resuscitation   s/p IV NS bolus in ED  repeat Na 136   will d/c IVF as pt met the goal.    #Pancytopenia.  WBC 2.49, Hb 10.3, Plt 123 on this admission some element of cytopenia noted on previous admission no active bleeding, lymphadenopathy noted may be in the setting of acute infection   monitor CMP  will consider heme/ onc referral upon discharge after resolution of acute infection.    # Nausea & vomiting.  ·  Plan: multiple episodes of vomiting, w/o abdominal pain   +ve COVID and elevated trop  s/p Zofran  will be cautious with rx of vomiting as pt has prolonged QTc.    #Elevated troponin.  ·  Plan: p/w nausea and vomiting, +ve COVID  Trop x1: 142.9   EKG : T wave inversion in inferolateral leads without ST elevation or depression unchanged from last admission  Pt had elevated trop on previous admissions  trop x2 slightly increased to 158  c/w trending trop  likely demand ischemia vs NSTEMI.    #HTN (hypertension).   ·  Plan: pmhx of HTN   primary team to kindly confirm med recs in AM   will give Amlodipine 5 mg now until med recs is performed and home medication is prescribed   monitor BP.   67F from home ambulating without assistance with pmhx of HTN and CVA on 2019 presented with nausea and multiple episodes of vomiting for the past 2 days. Pt noted to have hypokalemia and elevated troponin with positive COVID test in ED. Pt is admitted for electrolyte replacement, and ACS rule out.     #2019 novel coronavirus disease (COVID-19).  + COVID in ED Sat 98% on RA CXR normal pending official read as pt is currently sat well without respiratory symptoms will hold off the Remdesivir and Decadron     #Hypokalemia.  Plan: p/w nausea and vomiting  K of 2.6 on admission s/p KCl IV 10 mEq x3 in ED    #Hyponatremia.  ·  Plan: p/w nausea and vomiting   Na of 128 on admission  likely hypovolemic hypoNa  Resolved    #Pancytopenia.  WBC 2.49, Hb 10.3, Plt 123 on this admission some element of cytopenia noted on previous admission no active bleeding, lymphadenopathy noted may be in the setting of acute infection   monitor CMP  will consider heme/ onc referral upon discharge after resolution of acute infection.    # Nausea & vomiting.  ·  Plan: multiple episodes of vomiting, w/o abdominal pain   +ve COVID and elevated trop  s/p Zofran  Resolved    #Elevated troponin.  ·  Plan: p/w nausea and vomiting, +ve COVID  Trop x1: 142.9   EKG : T wave inversion in inferolateral leads without ST elevation or depression unchanged from last admission  Pt had elevated trop on previous admissions  trop x2 slightly increased to 158  c/w trending trop  likely demand ischemia vs NSTEMI.   67F from home ambulating without assistance with pmhx of HTN and CVA on 2019 presented with nausea and multiple episodes of vomiting for the past 2 days. Pt noted to have hypokalemia and elevated troponin with positive COVID test in ED. Pt is admitted for electrolyte replacement, and ACS rule out.     #2019 novel coronavirus disease (COVID-19).  + COVID in ED Sat 98% on RA CXR normal pending official read as pt is currently sat well without respiratory symptoms will hold off the Remdesivir and Decadron     #Hypokalemia.  Plan: p/w nausea and vomiting  K of 2.6 on admission s/p KCl IV 10 mEq x3 in ED ; resolving    #Hyponatremia.  ·  Plan: p/w nausea and vomiting   Na of 128 on admission  likely hypovolemic hyponatrem  Resolved    #Pancytopenia.  WBC 2.49, Hb 10.3, Plt 123 on this admission some element of cytopenia noted on previous admission no active bleeding, lymphadenopathy noted may be in the setting of acute infection   monitor CMP  will consider heme/ onc referral upon discharge after resolution of acute infection.    # Nausea & vomiting.  ·  Plan: multiple episodes of vomiting, w/o abdominal pain   +ve COVID and elevated trop  s/p Zofran  Resolved    #Elevated troponin.  ·  Plan: p/w nausea and vomiting, +ve COVID  Trop x1: 142.9   EKG : T wave inversion in inferolateral leads without ST elevation or depression unchanged from last admission  Pt had elevated trop on previous admissions  trop x2 slightly increased to 158  c/w trending trop  likely demand ischemia vs NSTEMI.

## 2023-07-02 NOTE — PROGRESS NOTE ADULT - PROBLEM SELECTOR PLAN 6
p/w nausea and vomiting, +ve COVID  Trop x1: 142.9   EKG : T wave inversion in inferolateral leads without ST elevation or depression unchanged from last admission  Pt had elevated trop on previous admissions  trop x2 slightly increased to 158  c/w trending trop  likely demand ischemia vs NSTEMI  7/1 ( 215)  7/2 (211)

## 2023-07-02 NOTE — PROGRESS NOTE ADULT - ATTENDING COMMENTS
Patient feeling better today. Troponin mildly elevated but stable, likely due to COVID infection. Tolerating diet, symptoms resolved, discharge home today.

## 2023-07-02 NOTE — DISCHARGE NOTE PROVIDER - NSDCCPCAREPLAN_GEN_ALL_CORE_FT
PRINCIPAL DISCHARGE DIAGNOSIS  Diagnosis: 2019 novel coronavirus disease (COVID-19)  Assessment and Plan of Treatment: You came in with nausea and vomiting, found to have COVID-19. This is a common symptom of the virus; you were given IV fluids and electrolyte repletion. You did not have any problems breathing and will not need any medications to help breathe.  Please refrain from going to public places for at least 5 days since testing positive so until July 7 to prevent spread of COVID 19.      SECONDARY DISCHARGE DIAGNOSES  Diagnosis: Nausea & vomiting  Assessment and Plan of Treatment: You came in with nausea and vomiting due to COVID, we gave you fluids and you improved. Please follow up with your PCP if these symptoms persist    Diagnosis: Hypokalemia  Assessment and Plan of Treatment: Your potassium was extremely low. This was likely due to your vomiting. We gave you potassium and your levels improved. Please follow up with your PCP and check your basic metabolic panel.    Diagnosis: Elevated troponin  Assessment and Plan of Treatment: You have an elevated troponin, this was likely due to stress on your lungs and heart from your nausea and vomiting. Please follow up with your PCP and cardiologist for an ECHO and STRESS TEST.    Diagnosis: Bradycardia  Assessment and Plan of Treatment: You have slow heart rate, EKGS were done showing no changes. You had no symptoms. Please follow up with your PCP and CARDIOLOGIST FOR ECHO AND STRESS TEST.    Diagnosis: Dizziness  Assessment and Plan of Treatment: As above.     PRINCIPAL DISCHARGE DIAGNOSIS  Diagnosis: 2019 novel coronavirus disease (COVID-19)  Assessment and Plan of Treatment: You came in with nausea and vomiting, found to have COVID-19. This is a common symptom of the virus; you were given IV fluids and electrolyte repletion. You did not have any problems breathing and will not need any medications to help breathe.  Please refrain from going to public places for at least 5 days since testing positive so until July 7 to prevent spread of COVID 19.      SECONDARY DISCHARGE DIAGNOSES  Diagnosis: Pancytopenia  Assessment and Plan of Treatment: You have low platelet levelsa and blood levels. Your levels are stables not requiring any transfusions. However PLEASE FOLLOW UP WITH YOUR PCP to identify cause and management for the low blood levels.    Diagnosis: Nausea & vomiting  Assessment and Plan of Treatment: You came in with nausea and vomiting due to COVID, we gave you fluids and you improved. Please follow up with your PCP if these symptoms persist    Diagnosis: Hypokalemia  Assessment and Plan of Treatment: Your potassium was extremely low. This was likely due to your vomiting. We gave you potassium and your levels improved. Please follow up with your PCP and check your basic metabolic panel.    Diagnosis: Elevated troponin  Assessment and Plan of Treatment: You have an elevated troponin, this was likely due to stress on your lungs and heart from your nausea and vomiting. Please follow up with your PCP and cardiologist for an ECHO and STRESS TEST.    Diagnosis: Bradycardia  Assessment and Plan of Treatment: You have slow heart rate, EKGS were done showing no changes. You had no symptoms. Please follow up with your PCP and CARDIOLOGIST FOR ECHO AND STRESS TEST.    Diagnosis: Dizziness  Assessment and Plan of Treatment: As above.     PRINCIPAL DISCHARGE DIAGNOSIS  Diagnosis: 2019 novel coronavirus disease (COVID-19)  Assessment and Plan of Treatment: You came in with nausea and vomiting, found to have COVID-19. This is a common symptom of the virus; you were given IV fluids and electrolyte repletion. You did not have any problems breathing and will not need any medications to help breathe.  Please refrain from going to public places for at least 5 days since testing positive so until July 7 to prevent spread of COVID 19.      SECONDARY DISCHARGE DIAGNOSES  Diagnosis: Bradycardia  Assessment and Plan of Treatment: You have slow heart rate, EKGS were done showing no changes. You had no symptoms. Please follow up with your PCP and CARDIOLOGIST FOR ECHO AND STRESS TEST TO ENSURE YOUR HEART ADEQUATELY RESPONDS TO STRESS    Diagnosis: Pancytopenia  Assessment and Plan of Treatment: You have low platelet levelsa and blood levels. Your levels are stables not requiring any transfusions. However PLEASE FOLLOW UP WITH YOUR PCP to identify cause and management for the low blood levels.    Diagnosis: Nausea & vomiting  Assessment and Plan of Treatment: You came in with nausea and vomiting due to COVID, we gave you fluids and you improved. Please follow up with your PCP if these symptoms persist    Diagnosis: Hypokalemia  Assessment and Plan of Treatment: Your potassium was extremely low. This was likely due to your vomiting. We gave you potassium and your levels improved. Please follow up with your PCP and check your basic metabolic panel.    Diagnosis: Elevated troponin  Assessment and Plan of Treatment: You have an elevated troponin, this was likely due to stress on your lungs and heart from your nausea and vomiting. Please follow up with your PCP and cardiologist for an ECHO and STRESS TEST.    Diagnosis: Dizziness  Assessment and Plan of Treatment: As above.

## 2023-07-02 NOTE — PROGRESS NOTE ADULT - PROBLEM SELECTOR PLAN 5
multiple episodes of vomiting, w/o abdominal pain   +ve COVID and elevated trop  s/p Zofran  will be cautious with rx of vomiting as pt has prolonged QTc  Resolving.

## 2023-07-02 NOTE — DISCHARGE NOTE PROVIDER - NSDCMRMEDTOKEN_GEN_ALL_CORE_FT
donepezil 5 mg oral tablet: 1 orally once a day  hydrALAZINE 50 mg oral tablet: 1 orally 3 times a day  Lipitor 10 mg oral tablet: 1 orally once a day  NIFEdipine 90 mg oral tablet, extended release: 1 orally 2 times a day  olmesartan 40 mg oral tablet: 1 orally once a day  raloxifene 60 mg oral tablet: 1 orally once a day  Ranexa 500 mg oral tablet, extended release: 1 orally 2 times a day  sertraline 50 mg oral tablet: 1 orally once a day  Vascepa 0.5 g oral capsule: 4 orally once a day

## 2023-07-02 NOTE — DISCHARGE NOTE PROVIDER - CARE PROVIDER_API CALL
Krystal Berger  Cardiology  [address] [place] [address]  Phone: [phone]  Fax: [phone]  Follow Up Time: 1 week   Krystal Berger  Cardiology  [address]  [place] [id number]  Phone: [phone]  Fax: [phone]  Follow Up Time: 1 week    Ana Gill  Internal Medicine  [address], [place] [address]  Phone: [phone]  Fax: [phone]  Follow Up Time: 1 week

## 2023-07-02 NOTE — DISCHARGE NOTE NURSING/CASE MANAGEMENT/SOCIAL WORK - NSDCPEFALRISK_GEN_ALL_CORE
For information on Fall & Injury Prevention, visit: [email or web address] OR  [email or web address] OR  https://www.cdc.gov/steadi/patient.html

## 2023-07-02 NOTE — PROGRESS NOTE ADULT - ASSESSMENT
67 yrs old F from home ambulating without assistance with pmhx of HTN and CVA on 2019 presented with nausea and multiple episodes of vomiting for the past 2 days. Pt noted to have hypokalemia and elevated troponin with positive COVID test in ED. Pt is admitted for electrolyte replacement, and ACS rule out.

## 2023-07-02 NOTE — PROGRESS NOTE ADULT - PROBLEM SELECTOR PLAN 4
WBC 2.49, Hb 10.3, Plt 123 on this admission  some element of cytopenia noted on previous admission   no active bleeding, lymphadenopathy noted   may be in the setting of acute infection   monitor CMP  will consider heme/ onc referral upon discharge after resolution of acute infection

## 2023-07-02 NOTE — DISCHARGE NOTE NURSING/CASE MANAGEMENT/SOCIAL WORK - PATIENT PORTAL LINK FT
You can access the FollowMyHealth Patient Portal offered by [facility] by registering at the following website: [email or web address]. By joining [email or web address] FollowMyHealth portal, you will also be able to view your health information using other applications (apps) compatible with our system.

## 2023-07-02 NOTE — PROGRESS NOTE ADULT - PROBLEM SELECTOR PLAN 1
+ COVID in ED  Sat 98% on RA  CXR normal   as pt is currently sat well without respiratory symptoms will hold off the Remdesivir and Decadron   will consider starting these meds if she becomes symptomatic  c/w supportive treatment

## 2023-07-02 NOTE — CONSULT NOTE ADULT - SUBJECTIVE AND OBJECTIVE BOX
CHIEF COMPLAINT:Patient is a 67y old  Female who presents with a chief complaint of hypokalemia and ACS rule out.      HPI:  67 yrs old F from home ambulating without assistance with pmhx of HTN,Anemia  and CVA on 2019 presented with nausea and multiple episodes of vomiting for the past 2 days. Pt stated that she vomited once yesterday and 5 times today mainly water without any blood. Pt reported decreased oral intake however denied any abdominal pain, diarrhea, upper respiratory symptoms. chest pain, palpitation, dizziness, urinary symptoms, fever, chills or sweating. Pt denied any recent travel or sick contacts. She takes medication for cholesterol, blood pressure however does not remember the name or dosage. She denied smoking, alcohol consumption, or use of illicit drugs. GOC was discussed, Pt is FULL CODE.  (01 Jul 2023 03:17)      PAST MEDICAL & SURGICAL HISTORY:  Stroke      HTN (hypertension)    Anemia    Bradycardia on prior admission      MEDICATIONS  (STANDING):  amLODIPine   Tablet 5 milliGRAM(s) Oral daily  enoxaparin Injectable 40 milliGRAM(s) SubCutaneous every 24 hours  lactated ringers. 1000 milliLiter(s) (75 mL/Hr) IV Continuous <Continuous>  pantoprazole    Tablet 40 milliGRAM(s) Oral before breakfast  potassium chloride   Powder 40 milliEquivalent(s) Oral two times a day  potassium phosphate / sodium phosphate Powder (PHOS-NaK) 1 Packet(s) Oral once    MEDICATIONS  (PRN):  ondansetron Injectable 4 milliGRAM(s) IV Push every 6 hours PRN Nausea and/or Vomiting      FAMILY HISTORY:No hx of CAD      SOCIAL HISTORY:    [x ] Non-smoker    [x ] Alcohol-denies    Allergies    No Known Allergies    Intolerances    	    REVIEW OF SYSTEMS:  CONSTITUTIONAL: No fever, weight loss, or fatigue  EYES: No eye pain, visual disturbances, or discharge  ENT:  No difficulty hearing, tinnitus, vertigo; No sinus or throat pain  NECK: No pain or stiffness  RESPIRATORY: No cough, wheezing, chills or hemoptysis; No Shortness of Breath  CARDIOVASCULAR: No chest pain, palpitations, passing out, dizziness, or leg swelling  GASTROINTESTINAL: No abdominal or epigastric pain. No nausea, vomiting, or hematemesis; No diarrhea or constipation. No melena or hematochezia.  GENITOURINARY: No dysuria, frequency, hematuria, or incontinence  NEUROLOGICAL: No headaches, memory loss, loss of strength, numbness, or tremors  SKIN: No itching, burning, rashes, or lesions   LYMPH Nodes: No enlarged glands  ENDOCRINE: No heat or cold intolerance; No hair loss  MUSCULOSKELETAL: No joint pain or swelling; No muscle, back, or extremity pain  PSYCHIATRIC: No depression, anxiety, mood swings, or difficulty sleeping  HEME/LYMPH: No easy bruising, or bleeding gums  ALLERGY AND IMMUNOLOGIC: No hives or eczema	        PHYSICAL EXAM:  T(C): 37 (07-02-23 @ 07:22), Max: 37 (07-02-23 @ 07:22)  HR: 44 (07-02-23 @ 07:22) (43 - 48)  BP: 159/65 (07-02-23 @ 07:22) (147/65 - 164/73)  RR: 18 (07-02-23 @ 07:22) (16 - 20)  SpO2: 97% (07-02-23 @ 07:22) (94% - 100%)  Wt(kg): --  I&O's Summary      Appearance: Normal	  HEENT:   Normal oral mucosa, PERRL, EOMI	  Lymphatic: No lymphadenopathy  Cardiovascular: Normal S1 S2, No JVD, No murmurs, No edema  Respiratory: Lungs clear to auscultation	  Psychiatry: A & O x 3, Mood & affect appropriate  Gastrointestinal:  Soft, Non-tender, + BS	  Skin: No rashes, No ecchymoses, No cyanosis	  Neurologic: Non-focal  Extremities: Normal range of motion, No clubbing, cyanosis or edema  Vascular: Peripheral pulses palpable 2+ bilaterally    TELEMETRY: 	nsr 40-60's    ECG:  	sinus bradycardia,lvh,non-specific st-t changes    LABS:	 	    CARDIAC MARKERS ( 01 Jul 2023 04:00 )  x     / x     / 136 U/L / x     / 1.5 ng/mL  CARDIAC MARKERS ( 30 Jun 2023 22:30 )  x     / x     / 106 U/L / x     / x          Troponin I, High Sensitivity Result: 211.4 ng/L (07-02-23 @ 06:20)  Troponin I, High Sensitivity Result: 215.3 ng/L (07-02-23 @ 03:00)  Troponin I, High Sensitivity Result: 209.9 ng/L (07-01-23 @ 23:40)  Troponin I, High Sensitivity Result: 193.7 ng/L (07-01-23 @ 20:12)  Troponin I, High Sensitivity Result: 192.2 ng/L (07-01-23 @ 16:02)  Troponin I, High Sensitivity Result: 158.3 ng/L (07-01-23 @ 05:15)  Troponin I, High Sensitivity Result: 142.9 ng/L (07-01-23 @ 00:05)                          10.3   4.03  )-----------( 128      ( 02 Jul 2023 06:20 )             32.5     07-02    140  |  108  |  15  ----------------------------<  93  3.0<L>   |  26  |  0.90    Ca    8.4      02 Jul 2023 06:20  Phos  2.3     07-02  Mg     2.1     07-02    TPro  7.2  /  Alb  3.2<L>  /  TBili  0.2  /  DBili  x   /  AST  20  /  ALT  25  /  AlkPhos  48  07-02    < from: Transthoracic Echocardiogram (07.18.22 @ 08:15) >  OBSERVATIONS:  Mitral Valve: Normal mitral valve.  Aortic Root: Aortic Root: 3.4 cm.    Aortic Valve: Normal trileaflet aortic valve.  Left Atrium: Mildly dilated left atrium.  LA volume index =  40 cc/m2.  Left Ventricle: Normal Left Ventricular Systolic Function,  (EF = 55 to 60%) Mild concentric left ventricular  hypertrophy. A false tendon is noted. This is a normal  variant.  GradeI diastolic dysfunction (Impaired  relaxation, mild).  Right Heart: Normal right atrium. Normal right ventricular  size and systolic function (TAPSE  2.9cm). There is mild  tricuspid regurgitation. There is trace pulmonic  regurgitation.  Pericardium/PleuraNormal pericardium with no pericardial  effusion.  Hemodynamic: RV systolic pressure is normal at  35 mm Hg.  Agitated saline injection was negative for intracardiac  shunt.    < end of copied text >  < from: MR Head No Cont (07.18.22 @ 17:11) >  ACC: [id number] EXAM:  MR ANGIO BRAIN                        ACC: [id number] EXAM:  MR BRAIN                          PROCEDURE DATE:  07/18/2022          INTERPRETATION:  CLINICAL INFORMATION: Dizziness. HMR. Admitting Dxs: R42   DIZZINESS AND GIDDINESS.    TECHNIQUE:  Multiplanar, multisequence brain MRI was performed without intravenous   contrast.  3-D time of flight brain MRA. 3-D reformats were obtained.    COMPARISON: CT head 7/16/2022.    Brain MRI:    Encephalomalacia and gliosis involvingthe body of the corpus callosum   and bilateral frontal white matter. Associated susceptibility artifact   suggesting hemorrhage in the right centrum semiovale. Small area of   encephalomalacia and gliosis and susceptibility artifact suggesting   hemorrhage in the right external capsule. Scattered foci of   susceptibility artifact in the bilateral cerebral hemispheres and thalami   suggesting chronic microhemorrhages.    There is no evidence of acute infarct. There are no foci of   susceptibilityartifact to suggest hemorrhage. Scattered foci of T2/FLAIR   hyperintensity in the bilateral hemispheric white matter are nonspecific   but likely related to chronic white matter microvascular ischemic   disease. The ventricles are normal in size forpatient's age. 1 cm pineal   cyst.    Flow voids of the major intracranial vessels at the skull base follow   expected course and contour.    The paranasal sinuses demonstrate no signal abnormality. The mastoids   demonstrate no signal abnormality.  Bilateral orbits are within normal   limits.    Brain MRA:    There is flow-related signal in the bilateral intradural internal   carotid, anterior cerebral and middle cerebral arteries. No evidence of   large vessel occlusion.    There is flow-related signal in the bilateral posterior cerebral,   basilar, and intradural vertebral arteries. The branch vasculature of the   posterior circulation is within normal limits. No evidence of large   vessel occlusion.    No evidence of aneurysm. Tiny aneurysms can be beyond the resolution of   MRA technique. No evidence of arteriovenous malformation.    IMPRESSION:    Brain MRI: No acute infarct.    Brain MRA: No large vessel occlusion or aneurysm.    --- End of Report ---            CAROLYN WILLIS MD; Attending Radiologist  This document has been electronically signed. Jul 19 2022  8:58AM    < end of copied text >

## 2023-07-02 NOTE — PROGRESS NOTE ADULT - PROBLEM SELECTOR PLAN 3
p/w nausea and vomiting   Na of 128 on admission.   likely hypovolemic hypoNa  urine lytes obtained after fluid resuscitation   s/p IV NS bolus in ED  repeat Na 140

## 2023-07-06 LAB
CULTURE RESULTS: SIGNIFICANT CHANGE UP
CULTURE RESULTS: SIGNIFICANT CHANGE UP
SPECIMEN SOURCE: SIGNIFICANT CHANGE UP
SPECIMEN SOURCE: SIGNIFICANT CHANGE UP

## 2024-08-20 NOTE — H&P ADULT - PROBLEM SELECTOR PLAN 7
Problem   31 Weeks Gestation of Pregnancy    Blood Type: A negative.   Pap neg 2024  GC/CT -  neg  PN1 Labs- nml, GBS bacteruria +, Thrombophilia panel neg  28 Week Labs- +GDM and anemia, CBC repeat early September    Blue folder- reviewed at intake  Yellow folder- has    Genetic screening- 5/1  AFP- neg  Level 1- 4/5/24  Level 2- 5/31  FG at 32 wks- next week     TDAP - 7/23  Rhogam - 7/23  Delivery consent- signed  Breast pump - ordered  Pediatrician - has    Perineal massage -  GBS swab - +GBS in urine  IOL -       31 weeks gestation of pregnancy  She feels well. She denies LOF/CTX/VB. She did not have any concerns today. We discussed fetal kick counting. Overview charting updated today.      
Lovenox  PPI

## 2024-10-04 NOTE — ED ADULT TRIAGE NOTE - ARRIVAL FROM
Anesthesia Post Evaluation    Patient: Tali Morales    Home

## 2024-12-23 NOTE — DISCHARGE NOTE PROVIDER - NSDCMRMEDTOKEN_GEN_ALL_CORE_FT
aspirin 81 mg oral tablet: 1 tab(s) orally once a day  hydralazine-hydrochlorothiazide 50 mg-50 mg oral capsule: 1 cap(s) orally once a day  Lipitor 20 mg oral tablet: 1 tab(s) orally once a day  meclizine 25 mg oral tablet: 1 tab(s) orally every 12 hours  NIFEdipine 90 mg oral tablet, extended release: 1 tab(s) orally once a day  
Discharged

## 2025-03-03 NOTE — ED ADULT NURSE NOTE - NSFALLUNIVINTERV_ED_ALL_ED
Toss toothbrush on day 3 of antibiotics.     Bed/Stretcher in lowest position, wheels locked, appropriate side rails in place/Call bell, personal items and telephone in reach/Instruct patient to call for assistance before getting out of bed/chair/stretcher/Non-slip footwear applied when patient is off stretcher/[place] to call system/Physically safe environment - no spills, clutter or unnecessary equipment/Purposeful proactive rounding/Room/bathroom lighting operational, light cord in reach

## 2025-05-12 NOTE — H&P ADULT - PROBLEM SELECTOR PROBLEM 2
Problem: At Risk for Falls  Goal: Patient does not fall  Outcome: Monitoring/Evaluating progress  Goal: Patient takes action to control fall-related risks  Outcome: Monitoring/Evaluating progress     Problem: At Risk for Injury Due to Fall  Goal: Patient does not fall  Outcome: Monitoring/Evaluating progress  Goal: Takes action to control condition specific risks  Outcome: Monitoring/Evaluating progress  Goal: Verbalizes understanding of fall-related injury personal risks  Description: Document education using the patient education activity  Outcome: Monitoring/Evaluating progress     Problem: Fluid Volume Excess  Goal: Fluid Volume Excess Symptoms Resolved  Description: Treatment often consists of oxygen and respiratory support with diuretic therapy at doses that exceed usual dose (typically doubled).  Monitor patient response to treatment.  Acute dyspnea should resolve quickly if dose is adequate and kidney function is adequate. Dyspnea/SOB should only be observed with Activity after effective treatment. Patient should be able to lie down comfortably, without SOB.  Outcome: Monitoring/Evaluating progress  Goal: Oxygenation is maintained (SpO2 greater than or equal to 90% or as ordered)  Outcome: Monitoring/Evaluating progress     Problem: Skin Integrity Alteration  Goal: Skin remains intact with no new/deterioration of wound or pressure injury  Outcome: Monitoring/Evaluating progress     Problem: Delirium, Risk for  Goal: # No symptoms of delirium  Description: Evaluate delirium symptoms under active problem when present  Outcome: Monitoring/Evaluating progress  Goal: # Verbalizes understanding of delirium preventive strategies  Description: Document on Patient Education Activity   Outcome: Monitoring/Evaluating progress     Problem: Delirium  Goal: # Symptoms of delirium resolved for 24 hours  Description: Evaluate delirium symptoms under active problem when present  Outcome: Monitoring/Evaluating  progress  Goal: # Verbalizes understanding of delirium symptoms, management, and follow up (family/patient)  Description: Document on Patient Education Activity   Outcome: Monitoring/Evaluating progress      PNA (pneumonia)

## 2025-07-01 NOTE — ED PROVIDER NOTE - CPE EDP RESP NORM
[facility]   DISCHARGE SUMMARY        MRN: [id number]    GENERAL INFORMATION:  - Admission Date/Time: 6/28/2025  4:10 PM  - Discharge Date/Time:  6/29/2025  6:20 PM  - PCP: Eduin Artis MD  notified via Perfect Serve/Epic         Discharge Diagnoses:  Gait disturbance     Hospital Course:  Patient is 76-year-old female, medical history of prior CVA 6 years ago with mild residual speech deficits, ESRD on nightly peritoneal dialysis, diastolic heart failure, aortic valve disease, hypertension, type 2 diabetes, hyperlipidemia presenting to [facility] for primarily 2 problems. Patient states she feels unsteady while walking and feels like that it is more neurological problem as she feels like she has been getting intermittently dizzy. She also states she is has bilateral feet edema which is slightly more prominent than before. Daughter at bedside who states she is not concerned about lower extremity edema but gait disturbance is concerning as she did have a fall in the last couple weeks with no significant injury.      Lightheadedness with gait disturbance  History of CVA with residual speech deficits  Orthostatic hypotension  - Suspect symptoms are due to recrudescence of stroke versus orthostatic hypotension  - CT head No acute intracranial abnormality. Chronic microvascular ischemia and involutional changes.   - MRI brain No acute brain abnormality. Chronic infarct in the left basal ganglia.  - Check orthostatic vitals: positive this morning  - Carotid duplex: shows boht arteries with <50% stenosis by velocity criteria. Visually there appears to be atherosclerotic plaque that narrows the lumen greater than 50%. D/w Neurology, given renal function, and lack of evidence of worsening stensosis compared to CTA report in 2019, recommend holding off on MRA and CTA at this time   - PT/OT: does not require ongoing therapy   - Neurology has been consulted: clear for discharge home  - For orthostatic hypotension  will place CLOVIS hose, give a small 250 cc fluid bolus, recommend slow positional, and further lifestyle changes as outlined in neurology note     ESRD on PD  - Continue calcitriol, Nephro-Will  - Nephrology consulted, as needed omissions     Recent tick bite  - Patient states having tick bite 1 month ago. Tick was noticed by her daughter few days after the the bite and removed. Patient was seen in urgent care, serological test for the Lyme disease and Borrelia B. was done which was negative. Patient also received doxycycline 100mg twice daily, she started taking this on 6/24. She was told to take this for 21 days however I told her she can stop taking this EOT 7/4 (10 day course) for possible early localized disease  -Doxycycline 100 mg twice daily x 10-day course     Hypertension, uncontrolled  Diastolic heart failure  - Continue carvedilol, hydralazine, amlodipine, losartan     Type 2 diabetes  - Continue home regimen insulin  - Diabetic diet     Hyperlipidemia  - Continue atorvastatin/Zetia     Elevated troponin  -In the setting of ESRD  -High-sensitivity troponin 133-122, flat  -EKG no acute ischemia  -Patient denies chest pain  -Cleared for discharge by cardiology     Disposition  - PT/OT: does not require ongoing therapy     Discharge Summary Plan   Discharge Status: improved. Medically stable.  Discharge instructions given: to patient   Discharge location: discharge to HOME   Discharge activity: as tolerated  Discharge diet: as tolerated   Prescriptions: continue home medications with no change.  New scripts as per EMR       Education and Follow-up   Counseled: patient, family, regarding diagnosis.       Follow up:  PCP in 1 week.   Follow-up Information       Follow up With Specialties Details Why Contact Info    Eduin Artis MD Internal Medicine Schedule an appointment as soon as possible for a visit in 1 week(s)  [address] [facility] [address] [phone]      [facility]  MED CTR EMERGENCY Emergency Medicine Go to  If symptoms worsen [address] [id number] [phone]    Ash Navarro MD Internal Medicine- Interventional Cardiology Follow up Follow up with your cardiologist Dr. Navarro or the APN in 2-3 weeks. Please call to make an appointment. [address]  [address]  [facility] [address]  [phone]               DISCHARGE INSTRUCTIONS  I have  reviewed all medications with patient/family and reviewed potential side effects. Patient/family  is to seek medical attention immediately should symptoms recurr, worsen, or if any other problems/abnormalities arise. Patient/family understands these instructions. Is to follow up with PCP as noted above. All specialists and consultants as noted. Patient/family educated about compliance with medications and expectations for the course of treatment.   Patient was thoroughly informed regarding new medications and possible interactions/adverse effects, recommendations from consultants and specialists. Pt was also instructed to call 911 and/or report to the emergency room should symptoms recurr or if any other problems arise.     PHYSICAL EXAM:  Visit Vitals  BP (!) 152/58   Pulse (!) 58   Temp 98.5 °F (36.9 °C)   Resp 16   SpO2 99%       Physical Exam:  General: Alert and oriented, no acute distress  Eyes: no scleral icterus, no conjunctival erythema   Cardio: S1, S2, RRR  Pulm: Lungs clear to auscultation bilaterally  GI: Soft, non-tender, nondistended. Normal bowel sounds auscultated x4 quadrants  : No suprapubic Tenderness, no CVA tenderness bilaterally  Ext: No upper or lower extremity edema noted. No cords palpated.   Musculoskeletal: Equal strength both upper and lower extremities.   Skin: No abnormal bruising or discoloration noted. No jaundice.   Psych: Appropriate mood and affect. Good Insight and Judgment  Neuro: No focal motor or sensory deficits noted. Pt appropriately follows commands.    Lab  Results:  Recent Labs   Lab 06/29/25  1406 06/29/25  0454 06/28/25 2001 06/28/25  1755   SODIUM  --  137  --  140   POTASSIUM 4.2 3.4  --  3.8   CHLORIDE  --  107  --  105   CO2  --  25  --  27   BUN  --  80*  --  74*   CREATININE  --  7.50*  --  7.74*   GLUCOSE  --  104*  --  127*   ALBUMIN  --  3.0*  --  3.6   AST  --  22  --  20   BILIRUBIN  --  0.3  --  0.3   TSH  --   --  2.930  --        Imaging:    US VASC CAROTID DUPLEX BILATERAL   Final Result      RIGHT CAROTID:   Internal carotid artery atherosclerotic disease. By velocity criteria,   stenosis is less than 50%. However, visually the atherosclerotic plaque   appears to narrow the lumen by greater than 50%. Consider CTA or MRA for   further evaluation.         LEFT CAROTID:   Internal carotid artery atherosclerotic disease. By velocity criteria,   stenosis is less than 50%. However, visually the atherosclerotic plaque   appears to narrow the lumen by greater than 50%. Consider CTA or MRA for   further evaluation.         VERTEBRAL ARTERY:   Normal antegrade flow.         ____________________________________________      [facility] UNIFORM INTERPRETATION CRITERIA* (2023)   Normal -   no sonographically visible plaque or intimal thickening and no   velocity elevation   <50% stenosis -   PSV <180 cm/s, with visible atherosclerotic plaque with   volume estimate <= 50%   50-69% stenosis -    - 230 cm/s, with visible plaque estimate at   least 50%   50-69%, alternate -   PSV<180 cm/s, but with >50% plaque and ratio >2.0 and   spectral broadening   >70% stenosis -   PSV >230 cm/s, with plaque estimate >= 50%   >80% stenosis -   >70% parameters, and with EDV >140 cm/s   Near occlusion -   markedly narrowed ICA lumen with variably high, low, or   undetectable PSV   Total occlusion -   no detectable patent lumen and no flow with spectral,   power, and color Doppler US      * valid for native bifurcation and ICA disease only   * do not apply with endarterectomy,  stent, CCA stenosis, dissection, FMD,   or global waveform abnormalities      Full AHMW interpretation criteria available at:   [email or web address]   AHMW%20Uniform%20Interpretation%20Vascular%20Imaging%20Criteria.pdf?[email or web address]   Te=[email or web address]            Electronically Signed by: ISABEL LUCIO M.D.    Signed on: 6/29/2025 12:51 PM    Workstation ID: [id number]      MRI BRAIN WO CONTRAST   Final Result      1.   No acute brain abnormality.   2.   Chronic infarct in the left basal ganglia.      Electronically Signed by: KATERIN MEEK MD    Signed on: 6/29/2025 7:14 AM    Workstation ID: [id number]      CT HEAD WO CONTRAST   Final Result   1.   No acute intracranial abnormality.   2.   Chronic microvascular ischemia and involutional changes.            Electronically Signed by: LAEXA RAHMAN MD    Signed on: 6/28/2025 11:50 PM    Workstation ID: [id number]      XR CHEST AP OR PA   Final Result      No acute cardiopulmonary abnormality.      Electronically Signed by: ALEXA RAHMAN MD    Signed on: 6/28/2025 6:17 PM    Workstation ID: [id number]           Pathology/CX results:  * Cannot find OR log *    Microbiology Results       None             No results found for the last 90 days.        Discharge Medications:       Summary of your Discharge Medications        Take these Medications        Details   amLODIPine 5 MG tablet  Commonly known as: NORVASC   Take 5 mg by mouth daily.     aspirin 81 MG EC tablet  Commonly known as: ECOTRIN   Take 81 mg by mouth daily.     atorvastatin 40 MG tablet  Commonly known as: LIPITOR   Take 40 mg by mouth daily.     Auryxia 1  MG(Fe) Tab   Generic drug: Ferric Citrate  Take 1 tablet by mouth in the morning and 1 tablet at noon and 1 tablet in the evening. Take with meals.     calcitRIOL 0.25 MCG capsule  Commonly known as: ROCALTROL   Take 0.25 mcg by mouth daily.     carvedilol 12.5 MG tablet  Commonly known  as: COREG   Take 12.5 mg by mouth in the morning and 12.5 mg in the evening.     DIALYVITE PO   Take 1 tablet by mouth daily.     doxycycline monohydrate 100 MG capsule  Commonly known as: MONODOX   Take 1 capsule by mouth in the morning and 1 capsule in the evening. For 10 days total     hydrALAZINE 25 MG tablet  Commonly known as: APRESOLINE   Take 25 mg by mouth in the morning and 25 mg in the evening.     insulin glargine 100 UNIT/ML vial solution  Commonly known as: LANTUS   Inject 6-10 Units into the skin nightly.     NovoLOG FlexPen 100 UNIT/ML pen-injector   Generic drug: insulin aspart  Inject 1-5 Units into the skin in the morning and 1-5 Units at noon and 1-5 Units in the evening. Inject before meals.                Primary Care Physician:  Eduin Artis MD      I spent 35 minutes on the discharge.  This includes the following: Reviewed all vitals, medications, new orders, I/O, labs, micro, radiology, nurses notes, pertinent consultant notes, as well as discussing patient's diagnosis and plan of care. This excludes any additional time noted for additional services such as advanced care planning, smoking cessation, substance abuse counseling which are separate from the 35 min time spent on discharge.     Primary care physician notified.  Discussed with neurologist and cardiologist       DO HAYDEN [facility]  7/1/2025 10:44 AM         normal...

## 2025-09-02 ENCOUNTER — INPATIENT (INPATIENT)
Facility: HOSPITAL | Age: 70
LOS: 1 days | Discharge: ANOTHER HOSPITAL | DRG: 305 | End: 2025-09-04
Attending: INTERNAL MEDICINE | Admitting: INTERNAL MEDICINE
Payer: MEDICARE

## 2025-09-02 VITALS
DIASTOLIC BLOOD PRESSURE: 85 MMHG | HEART RATE: 50 BPM | WEIGHT: 121.03 LBS | RESPIRATION RATE: 17 BRPM | OXYGEN SATURATION: 100 % | TEMPERATURE: 98 F | SYSTOLIC BLOOD PRESSURE: 147 MMHG | HEIGHT: 61 IN

## 2025-09-02 LAB
ALBUMIN SERPL ELPH-MCNC: 3.9 G/DL — SIGNIFICANT CHANGE UP (ref 3.5–5)
ALP SERPL-CCNC: 85 U/L — SIGNIFICANT CHANGE UP (ref 40–120)
ALT FLD-CCNC: 27 U/L DA — SIGNIFICANT CHANGE UP (ref 10–60)
ANION GAP SERPL CALC-SCNC: 5 MMOL/L — SIGNIFICANT CHANGE UP (ref 5–17)
ANISOCYTOSIS BLD QL: SLIGHT — SIGNIFICANT CHANGE UP
APPEARANCE UR: CLEAR — SIGNIFICANT CHANGE UP
AST SERPL-CCNC: 27 U/L — SIGNIFICANT CHANGE UP (ref 10–40)
BACTERIA # UR AUTO: ABNORMAL /HPF
BASOPHILS # BLD AUTO: 0.02 K/UL — SIGNIFICANT CHANGE UP (ref 0–0.2)
BASOPHILS NFR BLD AUTO: 0.6 % — SIGNIFICANT CHANGE UP (ref 0–2)
BILIRUB SERPL-MCNC: 0.4 MG/DL — SIGNIFICANT CHANGE UP (ref 0.2–1.2)
BILIRUB UR-MCNC: NEGATIVE — SIGNIFICANT CHANGE UP
BUN SERPL-MCNC: 15 MG/DL — SIGNIFICANT CHANGE UP (ref 7–18)
CALCIUM SERPL-MCNC: 8.8 MG/DL — SIGNIFICANT CHANGE UP (ref 8.4–10.5)
CHLORIDE SERPL-SCNC: 95 MMOL/L — LOW (ref 96–108)
CO2 SERPL-SCNC: 27 MMOL/L — SIGNIFICANT CHANGE UP (ref 22–31)
COLOR SPEC: YELLOW — SIGNIFICANT CHANGE UP
CREAT SERPL-MCNC: 0.98 MG/DL — SIGNIFICANT CHANGE UP (ref 0.5–1.3)
DIFF PNL FLD: NEGATIVE — SIGNIFICANT CHANGE UP
EGFR: 61 ML/MIN/1.73M2 — SIGNIFICANT CHANGE UP
EGFR: 61 ML/MIN/1.73M2 — SIGNIFICANT CHANGE UP
ELLIPTOCYTES BLD QL SMEAR: SLIGHT — SIGNIFICANT CHANGE UP
EOSINOPHIL # BLD AUTO: 0.01 K/UL — SIGNIFICANT CHANGE UP (ref 0–0.5)
EOSINOPHIL NFR BLD AUTO: 0.3 % — SIGNIFICANT CHANGE UP (ref 0–6)
EPI CELLS # UR: PRESENT
GLUCOSE SERPL-MCNC: 152 MG/DL — HIGH (ref 70–99)
GLUCOSE UR QL: 100 MG/DL
HCT VFR BLD CALC: 37 % — SIGNIFICANT CHANGE UP (ref 34.5–45)
HGB BLD-MCNC: 12 G/DL — SIGNIFICANT CHANGE UP (ref 11.5–15.5)
HYPOCHROMIA BLD QL: SLIGHT — SIGNIFICANT CHANGE UP
IMM GRANULOCYTES NFR BLD AUTO: 0.3 % — SIGNIFICANT CHANGE UP (ref 0–0.9)
KETONES UR QL: NEGATIVE MG/DL — SIGNIFICANT CHANGE UP
LEUKOCYTE ESTERASE UR-ACNC: NEGATIVE — SIGNIFICANT CHANGE UP
LIDOCAIN IGE QN: 39 U/L — SIGNIFICANT CHANGE UP (ref 13–75)
LYMPHOCYTES # BLD AUTO: 1.22 K/UL — SIGNIFICANT CHANGE UP (ref 1–3.3)
LYMPHOCYTES # BLD AUTO: 34.1 % — SIGNIFICANT CHANGE UP (ref 13–44)
MANUAL SMEAR VERIFICATION: SIGNIFICANT CHANGE UP
MCHC RBC-ENTMCNC: 21.7 PG — LOW (ref 27–34)
MCHC RBC-ENTMCNC: 32.4 G/DL — SIGNIFICANT CHANGE UP (ref 32–36)
MCV RBC AUTO: 67 FL — LOW (ref 80–100)
MONOCYTES # BLD AUTO: 0.37 K/UL — SIGNIFICANT CHANGE UP (ref 0–0.9)
MONOCYTES NFR BLD AUTO: 10.3 % — SIGNIFICANT CHANGE UP (ref 2–14)
NEUTROPHILS # BLD AUTO: 1.95 K/UL — SIGNIFICANT CHANGE UP (ref 1.8–7.4)
NEUTROPHILS NFR BLD AUTO: 54.4 % — SIGNIFICANT CHANGE UP (ref 43–77)
NITRITE UR-MCNC: NEGATIVE — SIGNIFICANT CHANGE UP
NRBC BLD AUTO-RTO: 0 /100 WBCS — SIGNIFICANT CHANGE UP (ref 0–0)
OVALOCYTES BLD QL SMEAR: SLIGHT — SIGNIFICANT CHANGE UP
PH UR: 7.5 — SIGNIFICANT CHANGE UP (ref 5–8)
PLAT MORPH BLD: NORMAL — SIGNIFICANT CHANGE UP
PLATELET # BLD AUTO: 118 K/UL — LOW (ref 150–400)
PLATELET COUNT - ESTIMATE: ABNORMAL
POIKILOCYTOSIS BLD QL AUTO: SLIGHT — SIGNIFICANT CHANGE UP
POTASSIUM SERPL-MCNC: 3.1 MMOL/L — LOW (ref 3.5–5.3)
POTASSIUM SERPL-SCNC: 3.1 MMOL/L — LOW (ref 3.5–5.3)
PROT SERPL-MCNC: 8.3 G/DL — SIGNIFICANT CHANGE UP (ref 6–8.3)
PROT UR-MCNC: 30 MG/DL
RBC # BLD: 5.52 M/UL — HIGH (ref 3.8–5.2)
RBC # FLD: 14.4 % — SIGNIFICANT CHANGE UP (ref 10.3–14.5)
RBC BLD AUTO: ABNORMAL
RBC CASTS # UR COMP ASSIST: 3 /HPF — SIGNIFICANT CHANGE UP (ref 0–4)
SODIUM SERPL-SCNC: 127 MMOL/L — LOW (ref 135–145)
SP GR SPEC: 1.01 — SIGNIFICANT CHANGE UP (ref 1–1.03)
TARGETS BLD QL SMEAR: SLIGHT — SIGNIFICANT CHANGE UP
UROBILINOGEN FLD QL: 0.2 MG/DL — SIGNIFICANT CHANGE UP (ref 0.2–1)
WBC # BLD: 3.58 K/UL — LOW (ref 3.8–10.5)
WBC # FLD AUTO: 3.58 K/UL — LOW (ref 3.8–10.5)
WBC UR QL: 1 /HPF — SIGNIFICANT CHANGE UP (ref 0–5)

## 2025-09-02 PROCEDURE — 70450 CT HEAD/BRAIN W/O DYE: CPT | Mod: 26

## 2025-09-02 PROCEDURE — 99285 EMERGENCY DEPT VISIT HI MDM: CPT

## 2025-09-02 RX ORDER — ONDANSETRON HCL/PF 4 MG/2 ML
4 VIAL (ML) INJECTION ONCE
Refills: 0 | Status: COMPLETED | OUTPATIENT
Start: 2025-09-02 | End: 2025-09-02

## 2025-09-02 RX ORDER — ACETAMINOPHEN 500 MG/5ML
1000 LIQUID (ML) ORAL ONCE
Refills: 0 | Status: COMPLETED | OUTPATIENT
Start: 2025-09-02 | End: 2025-09-02

## 2025-09-02 RX ADMIN — Medication 1000 MILLIGRAM(S): at 22:00

## 2025-09-02 RX ADMIN — Medication 1000 MILLILITER(S): at 21:51

## 2025-09-02 RX ADMIN — Medication 1000 MILLILITER(S): at 20:51

## 2025-09-02 RX ADMIN — Medication 400 MILLIGRAM(S): at 20:52

## 2025-09-02 RX ADMIN — Medication 4 MILLIGRAM(S): at 20:51

## 2025-09-02 RX ADMIN — Medication 1000 MILLIGRAM(S): at 21:15

## 2025-09-03 DIAGNOSIS — I10 ESSENTIAL (PRIMARY) HYPERTENSION: ICD-10-CM

## 2025-09-03 DIAGNOSIS — E78.5 HYPERLIPIDEMIA, UNSPECIFIED: ICD-10-CM

## 2025-09-03 DIAGNOSIS — Z29.9 ENCOUNTER FOR PROPHYLACTIC MEASURES, UNSPECIFIED: ICD-10-CM

## 2025-09-03 DIAGNOSIS — E87.1 HYPO-OSMOLALITY AND HYPONATREMIA: ICD-10-CM

## 2025-09-03 LAB
ALBUMIN SERPL ELPH-MCNC: 3.5 G/DL — SIGNIFICANT CHANGE UP (ref 3.5–5)
ALP SERPL-CCNC: 75 U/L — SIGNIFICANT CHANGE UP (ref 40–120)
ALT FLD-CCNC: 28 U/L DA — SIGNIFICANT CHANGE UP (ref 10–60)
ANION GAP SERPL CALC-SCNC: 8 MMOL/L — SIGNIFICANT CHANGE UP (ref 5–17)
AST SERPL-CCNC: 22 U/L — SIGNIFICANT CHANGE UP (ref 10–40)
BASOPHILS # BLD AUTO: 0.02 K/UL — SIGNIFICANT CHANGE UP (ref 0–0.2)
BASOPHILS NFR BLD AUTO: 0.6 % — SIGNIFICANT CHANGE UP (ref 0–2)
BILIRUB SERPL-MCNC: 0.5 MG/DL — SIGNIFICANT CHANGE UP (ref 0.2–1.2)
BUN SERPL-MCNC: 11 MG/DL — SIGNIFICANT CHANGE UP (ref 7–18)
CALCIUM SERPL-MCNC: 8 MG/DL — LOW (ref 8.4–10.5)
CHLORIDE SERPL-SCNC: 98 MMOL/L — SIGNIFICANT CHANGE UP (ref 96–108)
CO2 SERPL-SCNC: 25 MMOL/L — SIGNIFICANT CHANGE UP (ref 22–31)
CREAT SERPL-MCNC: 0.84 MG/DL — SIGNIFICANT CHANGE UP (ref 0.5–1.3)
EGFR: 74 ML/MIN/1.73M2 — SIGNIFICANT CHANGE UP
EGFR: 74 ML/MIN/1.73M2 — SIGNIFICANT CHANGE UP
EOSINOPHIL # BLD AUTO: 0.01 K/UL — SIGNIFICANT CHANGE UP (ref 0–0.5)
EOSINOPHIL NFR BLD AUTO: 0.3 % — SIGNIFICANT CHANGE UP (ref 0–6)
GLUCOSE SERPL-MCNC: 97 MG/DL — SIGNIFICANT CHANGE UP (ref 70–99)
HCT VFR BLD CALC: 37.6 % — SIGNIFICANT CHANGE UP (ref 34.5–45)
HGB BLD-MCNC: 12.1 G/DL — SIGNIFICANT CHANGE UP (ref 11.5–15.5)
IMM GRANULOCYTES NFR BLD AUTO: 0.3 % — SIGNIFICANT CHANGE UP (ref 0–0.9)
LYMPHOCYTES # BLD AUTO: 1.89 K/UL — SIGNIFICANT CHANGE UP (ref 1–3.3)
LYMPHOCYTES # BLD AUTO: 54.3 % — HIGH (ref 13–44)
MAGNESIUM SERPL-MCNC: 1.7 MG/DL — SIGNIFICANT CHANGE UP (ref 1.6–2.6)
MCHC RBC-ENTMCNC: 21.8 PG — LOW (ref 27–34)
MCHC RBC-ENTMCNC: 32.2 G/DL — SIGNIFICANT CHANGE UP (ref 32–36)
MCV RBC AUTO: 67.7 FL — LOW (ref 80–100)
MONOCYTES # BLD AUTO: 0.54 K/UL — SIGNIFICANT CHANGE UP (ref 0–0.9)
MONOCYTES NFR BLD AUTO: 15.5 % — HIGH (ref 2–14)
NEUTROPHILS # BLD AUTO: 1.01 K/UL — LOW (ref 1.8–7.4)
NEUTROPHILS NFR BLD AUTO: 29 % — LOW (ref 43–77)
NRBC BLD AUTO-RTO: 0 /100 WBCS — SIGNIFICANT CHANGE UP (ref 0–0)
OSMOLALITY SERPL: 276 MOSMOL/KG — LOW (ref 280–301)
OSMOLALITY UR: 364 MOS/KG — SIGNIFICANT CHANGE UP (ref 50–1200)
PHOSPHATE SERPL-MCNC: 3.5 MG/DL — SIGNIFICANT CHANGE UP (ref 2.5–4.5)
PLATELET # BLD AUTO: 123 K/UL — LOW (ref 150–400)
POTASSIUM SERPL-MCNC: 3.3 MMOL/L — LOW (ref 3.5–5.3)
POTASSIUM SERPL-SCNC: 3.3 MMOL/L — LOW (ref 3.5–5.3)
PROT SERPL-MCNC: 7.9 G/DL — SIGNIFICANT CHANGE UP (ref 6–8.3)
RBC # BLD: 5.55 M/UL — HIGH (ref 3.8–5.2)
RBC # FLD: 14.3 % — SIGNIFICANT CHANGE UP (ref 10.3–14.5)
SODIUM SERPL-SCNC: 131 MMOL/L — LOW (ref 135–145)
SODIUM UR-SCNC: 122 MMOL/L — SIGNIFICANT CHANGE UP
WBC # BLD: 3.48 K/UL — LOW (ref 3.8–10.5)
WBC # FLD AUTO: 3.48 K/UL — LOW (ref 3.8–10.5)

## 2025-09-03 PROCEDURE — 82962 GLUCOSE BLOOD TEST: CPT

## 2025-09-03 PROCEDURE — 36415 COLL VENOUS BLD VENIPUNCTURE: CPT

## 2025-09-03 PROCEDURE — 99223 1ST HOSP IP/OBS HIGH 75: CPT | Mod: GC

## 2025-09-03 PROCEDURE — 70450 CT HEAD/BRAIN W/O DYE: CPT

## 2025-09-03 PROCEDURE — 84100 ASSAY OF PHOSPHORUS: CPT

## 2025-09-03 PROCEDURE — 81001 URINALYSIS AUTO W/SCOPE: CPT

## 2025-09-03 PROCEDURE — 83690 ASSAY OF LIPASE: CPT

## 2025-09-03 PROCEDURE — 85025 COMPLETE CBC W/AUTO DIFF WBC: CPT

## 2025-09-03 PROCEDURE — 83935 ASSAY OF URINE OSMOLALITY: CPT

## 2025-09-03 PROCEDURE — 84300 ASSAY OF URINE SODIUM: CPT

## 2025-09-03 PROCEDURE — 80053 COMPREHEN METABOLIC PANEL: CPT

## 2025-09-03 PROCEDURE — 83735 ASSAY OF MAGNESIUM: CPT

## 2025-09-03 PROCEDURE — 83930 ASSAY OF BLOOD OSMOLALITY: CPT

## 2025-09-03 RX ORDER — POTASSIUM CHLORIDE, DEXTROSE MONOHYDRATE AND SODIUM CHLORIDE 150; 5; 900 MG/100ML; G/100ML; MG/100ML
1000 INJECTION, SOLUTION INTRAVENOUS
Refills: 0 | Status: DISCONTINUED | OUTPATIENT
Start: 2025-09-03 | End: 2025-09-04

## 2025-09-03 RX ORDER — ATORVASTATIN CALCIUM 80 MG/1
1 TABLET, FILM COATED ORAL
Refills: 0 | DISCHARGE

## 2025-09-03 RX ORDER — ATORVASTATIN CALCIUM 80 MG/1
20 TABLET, FILM COATED ORAL AT BEDTIME
Refills: 0 | Status: DISCONTINUED | OUTPATIENT
Start: 2025-09-03 | End: 2025-09-04

## 2025-09-03 RX ORDER — ACETAMINOPHEN 500 MG/5ML
650 LIQUID (ML) ORAL EVERY 6 HOURS
Refills: 0 | Status: DISCONTINUED | OUTPATIENT
Start: 2025-09-03 | End: 2025-09-04

## 2025-09-03 RX ORDER — ASPIRIN 325 MG
81 TABLET ORAL DAILY
Refills: 0 | Status: DISCONTINUED | OUTPATIENT
Start: 2025-09-03 | End: 2025-09-04

## 2025-09-03 RX ORDER — MELATONIN 5 MG
3 TABLET ORAL AT BEDTIME
Refills: 0 | Status: DISCONTINUED | OUTPATIENT
Start: 2025-09-03 | End: 2025-09-04

## 2025-09-03 RX ORDER — B1/B2/B3/B5/B6/B12/VIT C/FOLIC 500-0.5 MG
1 TABLET ORAL DAILY
Refills: 0 | Status: DISCONTINUED | OUTPATIENT
Start: 2025-09-03 | End: 2025-09-04

## 2025-09-03 RX ORDER — ONDANSETRON HCL/PF 4 MG/2 ML
4 VIAL (ML) INJECTION EVERY 8 HOURS
Refills: 0 | Status: DISCONTINUED | OUTPATIENT
Start: 2025-09-03 | End: 2025-09-04

## 2025-09-03 RX ORDER — ASPIRIN 325 MG
1 TABLET ORAL
Refills: 0 | DISCHARGE

## 2025-09-03 RX ORDER — LOSARTAN POTASSIUM 100 MG/1
100 TABLET, FILM COATED ORAL DAILY
Refills: 0 | Status: DISCONTINUED | OUTPATIENT
Start: 2025-09-03 | End: 2025-09-04

## 2025-09-03 RX ORDER — NIFEDIPINE 30 MG
90 TABLET, EXTENDED RELEASE 24 HR ORAL DAILY
Refills: 0 | Status: DISCONTINUED | OUTPATIENT
Start: 2025-09-03 | End: 2025-09-04

## 2025-09-03 RX ORDER — ENOXAPARIN SODIUM 100 MG/ML
40 INJECTION SUBCUTANEOUS EVERY 24 HOURS
Refills: 0 | Status: DISCONTINUED | OUTPATIENT
Start: 2025-09-03 | End: 2025-09-04

## 2025-09-03 RX ADMIN — Medication 90 MILLIGRAM(S): at 05:53

## 2025-09-03 RX ADMIN — Medication 81 MILLIGRAM(S): at 12:18

## 2025-09-03 RX ADMIN — LOSARTAN POTASSIUM 100 MILLIGRAM(S): 100 TABLET, FILM COATED ORAL at 05:53

## 2025-09-03 RX ADMIN — Medication 100 MILLIEQUIVALENT(S): at 13:17

## 2025-09-03 RX ADMIN — POTASSIUM CHLORIDE, DEXTROSE MONOHYDRATE AND SODIUM CHLORIDE 75 MILLILITER(S): 150; 5; 900 INJECTION, SOLUTION INTRAVENOUS at 12:14

## 2025-09-03 RX ADMIN — Medication 40 MILLIGRAM(S): at 09:42

## 2025-09-03 RX ADMIN — Medication 100 MILLIEQUIVALENT(S): at 14:28

## 2025-09-03 RX ADMIN — Medication 1 TABLET(S): at 12:14

## 2025-09-03 RX ADMIN — ENOXAPARIN SODIUM 40 MILLIGRAM(S): 100 INJECTION SUBCUTANEOUS at 04:32

## 2025-09-03 RX ADMIN — Medication 60 MILLILITER(S): at 05:53

## 2025-09-03 RX ADMIN — POTASSIUM CHLORIDE, DEXTROSE MONOHYDRATE AND SODIUM CHLORIDE 75 MILLILITER(S): 150; 5; 900 INJECTION, SOLUTION INTRAVENOUS at 17:21

## 2025-09-03 RX ADMIN — Medication 40 MILLIEQUIVALENT(S): at 04:32

## 2025-09-03 RX ADMIN — POTASSIUM CHLORIDE, DEXTROSE MONOHYDRATE AND SODIUM CHLORIDE 75 MILLILITER(S): 150; 5; 900 INJECTION, SOLUTION INTRAVENOUS at 21:33

## 2025-09-03 RX ADMIN — Medication 40 MILLIGRAM(S): at 17:20

## 2025-09-03 RX ADMIN — ATORVASTATIN CALCIUM 20 MILLIGRAM(S): 80 TABLET, FILM COATED ORAL at 21:32

## 2025-09-04 ENCOUNTER — INPATIENT (INPATIENT)
Facility: HOSPITAL | Age: 70
LOS: 0 days | Discharge: HOME | End: 2025-09-05
Attending: STUDENT IN AN ORGANIZED HEALTH CARE EDUCATION/TRAINING PROGRAM | Admitting: STUDENT IN AN ORGANIZED HEALTH CARE EDUCATION/TRAINING PROGRAM
Payer: MEDICARE

## 2025-09-04 VITALS
TEMPERATURE: 97 F | SYSTOLIC BLOOD PRESSURE: 159 MMHG | DIASTOLIC BLOOD PRESSURE: 68 MMHG | HEART RATE: 88 BPM | RESPIRATION RATE: 17 BRPM | OXYGEN SATURATION: 100 %

## 2025-09-04 VITALS
DIASTOLIC BLOOD PRESSURE: 74 MMHG | HEIGHT: 60.24 IN | RESPIRATION RATE: 20 BRPM | TEMPERATURE: 98 F | OXYGEN SATURATION: 97 % | HEART RATE: 39 BPM | SYSTOLIC BLOOD PRESSURE: 176 MMHG | WEIGHT: 119.05 LBS

## 2025-09-04 DIAGNOSIS — I24.9 ACUTE ISCHEMIC HEART DISEASE, UNSPECIFIED: ICD-10-CM

## 2025-09-04 DIAGNOSIS — I21.4 NON-ST ELEVATION (NSTEMI) MYOCARDIAL INFARCTION: ICD-10-CM

## 2025-09-04 PROBLEM — E78.5 HYPERLIPIDEMIA, UNSPECIFIED: Chronic | Status: ACTIVE | Noted: 2025-09-03

## 2025-09-04 LAB
ANION GAP SERPL CALC-SCNC: 2 MMOL/L — LOW (ref 5–17)
ANION GAP SERPL CALC-SCNC: 4 MMOL/L — LOW (ref 5–17)
APTT BLD: 48.2 SEC — HIGH (ref 26.1–36.8)
BUN SERPL-MCNC: 11 MG/DL — SIGNIFICANT CHANGE UP (ref 7–18)
BUN SERPL-MCNC: 12 MG/DL — SIGNIFICANT CHANGE UP (ref 7–18)
CALCIUM SERPL-MCNC: 8 MG/DL — LOW (ref 8.4–10.5)
CALCIUM SERPL-MCNC: 8.6 MG/DL — SIGNIFICANT CHANGE UP (ref 8.4–10.5)
CHLORIDE SERPL-SCNC: 105 MMOL/L — SIGNIFICANT CHANGE UP (ref 96–108)
CHLORIDE SERPL-SCNC: 107 MMOL/L — SIGNIFICANT CHANGE UP (ref 96–108)
CO2 SERPL-SCNC: 25 MMOL/L — SIGNIFICANT CHANGE UP (ref 22–31)
CO2 SERPL-SCNC: 27 MMOL/L — SIGNIFICANT CHANGE UP (ref 22–31)
CREAT SERPL-MCNC: 0.84 MG/DL — SIGNIFICANT CHANGE UP (ref 0.5–1.3)
CREAT SERPL-MCNC: 0.88 MG/DL — SIGNIFICANT CHANGE UP (ref 0.5–1.3)
EGFR: 70 ML/MIN/1.73M2 — SIGNIFICANT CHANGE UP
EGFR: 70 ML/MIN/1.73M2 — SIGNIFICANT CHANGE UP
EGFR: 74 ML/MIN/1.73M2 — SIGNIFICANT CHANGE UP
EGFR: 74 ML/MIN/1.73M2 — SIGNIFICANT CHANGE UP
GLUCOSE SERPL-MCNC: 84 MG/DL — SIGNIFICANT CHANGE UP (ref 70–99)
GLUCOSE SERPL-MCNC: 87 MG/DL — SIGNIFICANT CHANGE UP (ref 70–99)
HCT VFR BLD CALC: 36.7 % — SIGNIFICANT CHANGE UP (ref 34.5–45)
HGB BLD-MCNC: 11.6 G/DL — SIGNIFICANT CHANGE UP (ref 11.5–15.5)
MCHC RBC-ENTMCNC: 21.6 PG — LOW (ref 27–34)
MCHC RBC-ENTMCNC: 31.6 G/DL — LOW (ref 32–36)
MCV RBC AUTO: 68.3 FL — LOW (ref 80–100)
NRBC BLD AUTO-RTO: 0 /100 WBCS — SIGNIFICANT CHANGE UP (ref 0–0)
NT-PROBNP SERPL-SCNC: 355 PG/ML — HIGH (ref 0–125)
PLATELET # BLD AUTO: 115 K/UL — LOW (ref 150–400)
POTASSIUM SERPL-MCNC: 3.3 MMOL/L — LOW (ref 3.5–5.3)
POTASSIUM SERPL-MCNC: 3.4 MMOL/L — LOW (ref 3.5–5.3)
POTASSIUM SERPL-SCNC: 3.3 MMOL/L — LOW (ref 3.5–5.3)
POTASSIUM SERPL-SCNC: 3.4 MMOL/L — LOW (ref 3.5–5.3)
RBC # BLD: 5.37 M/UL — HIGH (ref 3.8–5.2)
RBC # FLD: 14.6 % — HIGH (ref 10.3–14.5)
SODIUM SERPL-SCNC: 134 MMOL/L — LOW (ref 135–145)
SODIUM SERPL-SCNC: 136 MMOL/L — SIGNIFICANT CHANGE UP (ref 135–145)
TROPONIN I, HIGH SENSITIVITY RESULT: 178.3 NG/L — HIGH
TROPONIN I, HIGH SENSITIVITY RESULT: 192.5 NG/L — HIGH
TSH SERPL-MCNC: 5.58 UU/ML — HIGH (ref 0.34–4.82)
WBC # BLD: 3.5 K/UL — LOW (ref 3.8–10.5)
WBC # FLD AUTO: 3.5 K/UL — LOW (ref 3.8–10.5)

## 2025-09-04 PROCEDURE — 0523T NTRAPX C FFR W/3D FUNCJL MAP: CPT

## 2025-09-04 PROCEDURE — 99285 EMERGENCY DEPT VISIT HI MDM: CPT | Mod: 25

## 2025-09-04 PROCEDURE — 84484 ASSAY OF TROPONIN QUANT: CPT

## 2025-09-04 PROCEDURE — 70450 CT HEAD/BRAIN W/O DYE: CPT

## 2025-09-04 PROCEDURE — 84100 ASSAY OF PHOSPHORUS: CPT

## 2025-09-04 PROCEDURE — 85025 COMPLETE CBC W/AUTO DIFF WBC: CPT

## 2025-09-04 PROCEDURE — 83735 ASSAY OF MAGNESIUM: CPT

## 2025-09-04 PROCEDURE — 80048 BASIC METABOLIC PNL TOTAL CA: CPT

## 2025-09-04 PROCEDURE — 83690 ASSAY OF LIPASE: CPT

## 2025-09-04 PROCEDURE — 93458 L HRT ARTERY/VENTRICLE ANGIO: CPT | Mod: 26

## 2025-09-04 PROCEDURE — 80053 COMPREHEN METABOLIC PANEL: CPT

## 2025-09-04 PROCEDURE — 99239 HOSP IP/OBS DSCHRG MGMT >30: CPT | Mod: GC

## 2025-09-04 PROCEDURE — 36415 COLL VENOUS BLD VENIPUNCTURE: CPT

## 2025-09-04 PROCEDURE — 99222 1ST HOSP IP/OBS MODERATE 55: CPT

## 2025-09-04 PROCEDURE — 85730 THROMBOPLASTIN TIME PARTIAL: CPT

## 2025-09-04 PROCEDURE — 93010 ELECTROCARDIOGRAM REPORT: CPT | Mod: 76

## 2025-09-04 PROCEDURE — 82962 GLUCOSE BLOOD TEST: CPT

## 2025-09-04 PROCEDURE — 85027 COMPLETE CBC AUTOMATED: CPT

## 2025-09-04 PROCEDURE — 83880 ASSAY OF NATRIURETIC PEPTIDE: CPT

## 2025-09-04 PROCEDURE — 81001 URINALYSIS AUTO W/SCOPE: CPT

## 2025-09-04 PROCEDURE — 99223 1ST HOSP IP/OBS HIGH 75: CPT | Mod: 25

## 2025-09-04 PROCEDURE — 93005 ELECTROCARDIOGRAM TRACING: CPT

## 2025-09-04 PROCEDURE — 83930 ASSAY OF BLOOD OSMOLALITY: CPT

## 2025-09-04 PROCEDURE — 83935 ASSAY OF URINE OSMOLALITY: CPT

## 2025-09-04 PROCEDURE — 99223 1ST HOSP IP/OBS HIGH 75: CPT

## 2025-09-04 PROCEDURE — 99152 MOD SED SAME PHYS/QHP 5/>YRS: CPT

## 2025-09-04 PROCEDURE — 93010 ELECTROCARDIOGRAM REPORT: CPT | Mod: 77

## 2025-09-04 PROCEDURE — T1013: CPT

## 2025-09-04 PROCEDURE — 84443 ASSAY THYROID STIM HORMONE: CPT

## 2025-09-04 PROCEDURE — 84300 ASSAY OF URINE SODIUM: CPT

## 2025-09-04 RX ORDER — B1/B2/B3/B5/B6/B12/VIT C/FOLIC 500-0.5 MG
1 TABLET ORAL
Qty: 0 | Refills: 0 | DISCHARGE
Start: 2025-09-04

## 2025-09-04 RX ORDER — B1/B2/B3/B5/B6/B12/VIT C/FOLIC 500-0.5 MG
1 TABLET ORAL
Refills: 0 | DISCHARGE

## 2025-09-04 RX ORDER — B1/B2/B3/B5/B6/B12/VIT C/FOLIC 500-0.5 MG
1 TABLET ORAL DAILY
Refills: 0 | Status: DISCONTINUED | OUTPATIENT
Start: 2025-09-04 | End: 2025-09-05

## 2025-09-04 RX ORDER — HEPARIN SODIUM 1000 [USP'U]/ML
3200 INJECTION INTRAVENOUS; SUBCUTANEOUS ONCE
Refills: 0 | Status: DISCONTINUED | OUTPATIENT
Start: 2025-09-04 | End: 2025-09-04

## 2025-09-04 RX ORDER — HEPARIN SODIUM 1000 [USP'U]/ML
3200 INJECTION INTRAVENOUS; SUBCUTANEOUS EVERY 6 HOURS
Refills: 0 | Status: DISCONTINUED | OUTPATIENT
Start: 2025-09-04 | End: 2025-09-04

## 2025-09-04 RX ORDER — FENTANYL CITRATE-0.9 % NACL/PF 100MCG/2ML
25 SYRINGE (ML) INTRAVENOUS ONCE
Refills: 0 | Status: DISCONTINUED | OUTPATIENT
Start: 2025-09-04 | End: 2025-09-04

## 2025-09-04 RX ORDER — ACETAMINOPHEN 500 MG/5ML
2 LIQUID (ML) ORAL
Qty: 0 | Refills: 0 | DISCHARGE
Start: 2025-09-04

## 2025-09-04 RX ORDER — CLOPIDOGREL BISULFATE 75 MG/1
300 TABLET, FILM COATED ORAL ONCE
Refills: 0 | Status: COMPLETED | OUTPATIENT
Start: 2025-09-04 | End: 2025-09-04

## 2025-09-04 RX ORDER — ACETAMINOPHEN 500 MG/5ML
650 LIQUID (ML) ORAL EVERY 6 HOURS
Refills: 0 | Status: DISCONTINUED | OUTPATIENT
Start: 2025-09-04 | End: 2025-09-05

## 2025-09-04 RX ORDER — CLOPIDOGREL BISULFATE 75 MG/1
1 TABLET, FILM COATED ORAL
Qty: 0 | Refills: 0 | DISCHARGE
Start: 2025-09-04

## 2025-09-04 RX ORDER — ASPIRIN 325 MG
325 TABLET ORAL ONCE
Refills: 0 | Status: COMPLETED | OUTPATIENT
Start: 2025-09-04 | End: 2025-09-04

## 2025-09-04 RX ORDER — ATORVASTATIN CALCIUM 80 MG/1
20 TABLET, FILM COATED ORAL AT BEDTIME
Refills: 0 | Status: DISCONTINUED | OUTPATIENT
Start: 2025-09-04 | End: 2025-09-05

## 2025-09-04 RX ORDER — LOSARTAN POTASSIUM 100 MG/1
1 TABLET, FILM COATED ORAL
Qty: 0 | Refills: 0 | DISCHARGE
Start: 2025-09-04

## 2025-09-04 RX ORDER — ASPIRIN 325 MG
81 TABLET ORAL DAILY
Refills: 0 | Status: DISCONTINUED | OUTPATIENT
Start: 2025-09-04 | End: 2025-09-05

## 2025-09-04 RX ORDER — HEPARIN SODIUM 1000 [USP'U]/ML
INJECTION INTRAVENOUS; SUBCUTANEOUS
Qty: 25000 | Refills: 0 | Status: DISCONTINUED | OUTPATIENT
Start: 2025-09-04 | End: 2025-09-04

## 2025-09-04 RX ORDER — CLOPIDOGREL BISULFATE 75 MG/1
75 TABLET, FILM COATED ORAL DAILY
Refills: 0 | Status: DISCONTINUED | OUTPATIENT
Start: 2025-09-05 | End: 2025-09-04

## 2025-09-04 RX ORDER — LOSARTAN POTASSIUM 100 MG/1
100 TABLET, FILM COATED ORAL DAILY
Refills: 0 | Status: DISCONTINUED | OUTPATIENT
Start: 2025-09-04 | End: 2025-09-05

## 2025-09-04 RX ADMIN — Medication 3 MILLILITER(S): at 22:50

## 2025-09-04 RX ADMIN — CLOPIDOGREL BISULFATE 300 MILLIGRAM(S): 75 TABLET, FILM COATED ORAL at 11:44

## 2025-09-04 RX ADMIN — Medication 40 MILLIGRAM(S): at 06:41

## 2025-09-04 RX ADMIN — ENOXAPARIN SODIUM 40 MILLIGRAM(S): 100 INJECTION SUBCUTANEOUS at 04:29

## 2025-09-04 RX ADMIN — Medication 20 MILLIEQUIVALENT(S): at 11:34

## 2025-09-04 RX ADMIN — LOSARTAN POTASSIUM 100 MILLIGRAM(S): 100 TABLET, FILM COATED ORAL at 06:41

## 2025-09-04 RX ADMIN — Medication 100 MILLILITER(S): at 21:40

## 2025-09-04 RX ADMIN — Medication 25 MICROGRAM(S): at 20:40

## 2025-09-04 RX ADMIN — Medication 90 MILLIGRAM(S): at 06:41

## 2025-09-04 RX ADMIN — Medication 325 MILLIGRAM(S): at 11:36

## 2025-09-04 RX ADMIN — Medication 100 MILLILITER(S): at 18:11

## 2025-09-04 RX ADMIN — HEPARIN SODIUM 650 UNIT(S)/HR: 1000 INJECTION INTRAVENOUS; SUBCUTANEOUS at 12:08

## 2025-09-05 ENCOUNTER — TRANSCRIPTION ENCOUNTER (OUTPATIENT)
Age: 70
End: 2025-09-05

## 2025-09-05 VITALS
RESPIRATION RATE: 18 BRPM | TEMPERATURE: 98 F | OXYGEN SATURATION: 100 % | DIASTOLIC BLOOD PRESSURE: 71 MMHG | HEART RATE: 42 BPM | SYSTOLIC BLOOD PRESSURE: 178 MMHG

## 2025-09-05 LAB
ADD ON TEST-SPECIMEN IN LAB: SIGNIFICANT CHANGE UP
ANION GAP SERPL CALC-SCNC: 16 MMOL/L — HIGH (ref 7–14)
APTT BLD: 51.9 SEC — HIGH (ref 26.1–36.8)
BLD GP AB SCN SERPL QL: NEGATIVE — SIGNIFICANT CHANGE UP
BUN SERPL-MCNC: 16 MG/DL — SIGNIFICANT CHANGE UP (ref 7–23)
CALCIUM SERPL-MCNC: 9.2 MG/DL — SIGNIFICANT CHANGE UP (ref 8.4–10.5)
CHLORIDE SERPL-SCNC: 101 MMOL/L — SIGNIFICANT CHANGE UP (ref 98–107)
CHOLEST SERPL-MCNC: 228 MG/DL — HIGH
CO2 SERPL-SCNC: 19 MMOL/L — LOW (ref 22–31)
CREAT SERPL-MCNC: 0.82 MG/DL — SIGNIFICANT CHANGE UP (ref 0.5–1.3)
EGFR: 76 ML/MIN/1.73M2 — SIGNIFICANT CHANGE UP
EGFR: 76 ML/MIN/1.73M2 — SIGNIFICANT CHANGE UP
GLUCOSE SERPL-MCNC: 77 MG/DL — SIGNIFICANT CHANGE UP (ref 70–99)
HCT VFR BLD CALC: 40 % — SIGNIFICANT CHANGE UP (ref 34.5–45)
HDLC SERPL-MCNC: 58 MG/DL — SIGNIFICANT CHANGE UP
HGB BLD-MCNC: 12.7 G/DL — SIGNIFICANT CHANGE UP (ref 11.5–15.5)
INR BLD: 0.95 RATIO — SIGNIFICANT CHANGE UP (ref 0.85–1.16)
LDLC SERPL-MCNC: 158 MG/DL — HIGH
LIPID PNL WITH DIRECT LDL SERPL: 158 MG/DL — HIGH
MAGNESIUM SERPL-MCNC: 2 MG/DL — SIGNIFICANT CHANGE UP (ref 1.6–2.6)
MCHC RBC-ENTMCNC: 21.7 PG — LOW (ref 27–34)
MCHC RBC-ENTMCNC: 31.8 G/DL — LOW (ref 32–36)
MCV RBC AUTO: 68.3 FL — LOW (ref 80–100)
NONHDLC SERPL-MCNC: 170 MG/DL — HIGH
NRBC # BLD AUTO: 0 K/UL — SIGNIFICANT CHANGE UP (ref 0–0)
NRBC # FLD: 0 K/UL — SIGNIFICANT CHANGE UP (ref 0–0)
NRBC BLD AUTO-RTO: 0 /100 WBCS — SIGNIFICANT CHANGE UP (ref 0–0)
PLATELET # BLD AUTO: 135 K/UL — LOW (ref 150–400)
PMV BLD: 10.3 FL — SIGNIFICANT CHANGE UP (ref 7–13)
POTASSIUM SERPL-MCNC: 3.4 MMOL/L — LOW (ref 3.5–5.3)
POTASSIUM SERPL-SCNC: 3.4 MMOL/L — LOW (ref 3.5–5.3)
PROTHROM AB SERPL-ACNC: 11 SEC — SIGNIFICANT CHANGE UP (ref 9.9–13.4)
RBC # BLD: 5.86 M/UL — HIGH (ref 3.8–5.2)
RBC # FLD: 15 % — HIGH (ref 10.3–14.5)
RH IG SCN BLD-IMP: POSITIVE — SIGNIFICANT CHANGE UP
SODIUM SERPL-SCNC: 136 MMOL/L — SIGNIFICANT CHANGE UP (ref 135–145)
TRIGL SERPL-MCNC: 68 MG/DL — SIGNIFICANT CHANGE UP
WBC # BLD: 4.88 K/UL — SIGNIFICANT CHANGE UP (ref 3.8–10.5)
WBC # FLD AUTO: 4.88 K/UL — SIGNIFICANT CHANGE UP (ref 3.8–10.5)

## 2025-09-05 PROCEDURE — 99239 HOSP IP/OBS DSCHRG MGMT >30: CPT

## 2025-09-05 PROCEDURE — 99232 SBSQ HOSP IP/OBS MODERATE 35: CPT

## 2025-09-05 RX ORDER — INFLUENZA A VIRUS A/IDAHO/07/2018 (H1N1) ANTIGEN (MDCK CELL DERIVED, PROPIOLACTONE INACTIVATED, INFLUENZA A VIRUS A/INDIANA/08/2018 (H3N2) ANTIGEN (MDCK CELL DERIVED, PROPIOLACTONE INACTIVATED), INFLUENZA B VIRUS B/SINGAPORE/INFTT-16-0610/2016 ANTIGEN (MDCK CELL DERIVED, PROPIOLACTONE INACTIVATED), INFLUENZA B VIRUS B/IOWA/06/2017 ANTIGEN (MDCK CELL DERIVED, PROPIOLACTONE INACTIVATED) 15; 15; 15; 15 UG/.5ML; UG/.5ML; UG/.5ML; UG/.5ML
0.5 INJECTION, SUSPENSION INTRAMUSCULAR ONCE
Refills: 0 | Status: DISCONTINUED | OUTPATIENT
Start: 2025-09-05 | End: 2025-09-05

## 2025-09-05 RX ADMIN — LOSARTAN POTASSIUM 100 MILLIGRAM(S): 100 TABLET, FILM COATED ORAL at 05:00

## 2025-09-05 RX ADMIN — Medication 1 APPLICATION(S): at 11:26

## 2025-09-05 RX ADMIN — Medication 1 TABLET(S): at 11:22

## 2025-09-05 RX ADMIN — Medication 650 MILLIGRAM(S): at 01:44

## 2025-09-05 RX ADMIN — Medication 3 MILLILITER(S): at 14:00

## 2025-09-05 RX ADMIN — Medication 650 MILLIGRAM(S): at 01:09

## 2025-09-05 RX ADMIN — Medication 81 MILLIGRAM(S): at 11:22

## 2025-09-06 ENCOUNTER — APPOINTMENT (OUTPATIENT)
Dept: CARDIOLOGY | Facility: CLINIC | Age: 70
End: 2025-09-06

## 2025-09-06 VITALS
BODY MASS INDEX: 21.98 KG/M2 | HEIGHT: 62.2 IN | SYSTOLIC BLOOD PRESSURE: 154 MMHG | RESPIRATION RATE: 15 BRPM | HEART RATE: 45 BPM | DIASTOLIC BLOOD PRESSURE: 69 MMHG | WEIGHT: 121 LBS | OXYGEN SATURATION: 100 %

## 2025-09-06 DIAGNOSIS — R00.1 BRADYCARDIA, UNSPECIFIED: ICD-10-CM

## 2025-09-06 PROBLEM — Z00.00 ENCOUNTER FOR PREVENTIVE HEALTH EXAMINATION: Status: ACTIVE | Noted: 2025-09-06

## 2025-09-11 ENCOUNTER — TRANSCRIPTION ENCOUNTER (OUTPATIENT)
Age: 70
End: 2025-09-11

## 2025-09-12 PROBLEM — I63.9 CEREBRAL INFARCTION, UNSPECIFIED: Chronic | Status: ACTIVE | Noted: 2025-09-04

## 2025-09-16 ENCOUNTER — APPOINTMENT (OUTPATIENT)
Dept: CARDIOLOGY | Facility: CLINIC | Age: 70
End: 2025-09-16